# Patient Record
Sex: FEMALE | Race: WHITE | NOT HISPANIC OR LATINO | Employment: OTHER | URBAN - NONMETROPOLITAN AREA
[De-identification: names, ages, dates, MRNs, and addresses within clinical notes are randomized per-mention and may not be internally consistent; named-entity substitution may affect disease eponyms.]

---

## 2017-03-01 DIAGNOSIS — F33.1 MAJOR DEPRESSIVE DISORDER, RECURRENT EPISODE, MODERATE (HCC): ICD-10-CM

## 2017-03-02 RX ORDER — SERTRALINE HYDROCHLORIDE 100 MG/1
150 TABLET, FILM COATED ORAL DAILY
Qty: 45 TABLET | Refills: 1 | Status: SHIPPED | OUTPATIENT
Start: 2017-03-02 | End: 2017-05-15 | Stop reason: SDUPTHER

## 2017-05-15 ENCOUNTER — OFFICE VISIT (OUTPATIENT)
Dept: PSYCHIATRY | Facility: CLINIC | Age: 67
End: 2017-05-15

## 2017-05-15 VITALS
HEART RATE: 85 BPM | BODY MASS INDEX: 32.33 KG/M2 | WEIGHT: 206 LBS | DIASTOLIC BLOOD PRESSURE: 57 MMHG | SYSTOLIC BLOOD PRESSURE: 120 MMHG | HEIGHT: 67 IN

## 2017-05-15 DIAGNOSIS — E78.5 HYPERLIPIDEMIA, UNSPECIFIED HYPERLIPIDEMIA TYPE: ICD-10-CM

## 2017-05-15 DIAGNOSIS — F33.1 MAJOR DEPRESSIVE DISORDER, RECURRENT EPISODE, MODERATE (HCC): Primary | ICD-10-CM

## 2017-05-15 DIAGNOSIS — F41.1 GENERALIZED ANXIETY DISORDER: ICD-10-CM

## 2017-05-15 DIAGNOSIS — F33.1 MAJOR DEPRESSIVE DISORDER, RECURRENT EPISODE, MODERATE (HCC): ICD-10-CM

## 2017-05-15 DIAGNOSIS — I15.9 SECONDARY HYPERTENSION: ICD-10-CM

## 2017-05-15 PROCEDURE — 99213 OFFICE O/P EST LOW 20 MIN: CPT | Performed by: NURSE PRACTITIONER

## 2017-05-15 RX ORDER — INSULIN LISPRO 100 [IU]/ML
INJECTION, SOLUTION INTRAVENOUS; SUBCUTANEOUS
COMMUNITY
Start: 2017-03-11 | End: 2022-07-22

## 2017-05-15 RX ORDER — SERTRALINE HYDROCHLORIDE 100 MG/1
150 TABLET, FILM COATED ORAL DAILY
Qty: 45 TABLET | Refills: 2 | Status: SHIPPED | OUTPATIENT
Start: 2017-05-15 | End: 2017-08-04 | Stop reason: SDUPTHER

## 2017-05-15 RX ORDER — INSULIN GLARGINE 100 [IU]/ML
INJECTION, SOLUTION SUBCUTANEOUS DAILY
COMMUNITY
End: 2022-07-22

## 2017-05-15 RX ORDER — MIRTAZAPINE 30 MG/1
30 TABLET, FILM COATED ORAL NIGHTLY
Qty: 90 TABLET | Refills: 1 | Status: SHIPPED | OUTPATIENT
Start: 2017-05-15 | End: 2017-05-15 | Stop reason: HOSPADM

## 2017-05-15 RX ORDER — SERTRALINE HYDROCHLORIDE 100 MG/1
150 TABLET, FILM COATED ORAL DAILY
Qty: 45 TABLET | Refills: 2 | Status: SHIPPED | OUTPATIENT
Start: 2017-05-15 | End: 2017-05-15 | Stop reason: SDUPTHER

## 2017-05-15 RX ORDER — TRAZODONE HYDROCHLORIDE 50 MG/1
50 TABLET ORAL NIGHTLY
Qty: 90 TABLET | Refills: 1 | Status: SHIPPED | OUTPATIENT
Start: 2017-05-15 | End: 2017-09-12 | Stop reason: SDUPTHER

## 2017-05-15 RX ORDER — PANTOPRAZOLE SODIUM 20 MG/1
TABLET, DELAYED RELEASE ORAL DAILY
COMMUNITY
Start: 2017-04-20 | End: 2018-11-06 | Stop reason: DRUGHIGH

## 2017-07-21 ENCOUNTER — OFFICE VISIT (OUTPATIENT)
Dept: PSYCHIATRY | Facility: CLINIC | Age: 67
End: 2017-07-21

## 2017-07-21 DIAGNOSIS — F41.1 GENERALIZED ANXIETY DISORDER: ICD-10-CM

## 2017-07-21 DIAGNOSIS — F33.1 MODERATE EPISODE OF RECURRENT MAJOR DEPRESSIVE DISORDER (HCC): Primary | ICD-10-CM

## 2017-07-21 PROCEDURE — 90834 PSYTX W PT 45 MINUTES: CPT | Performed by: SOCIAL WORKER

## 2017-07-21 NOTE — PROGRESS NOTES
"    PROGRESS NOTE  Data:  Charito Freeman was seen for her regularly scheduled individual psychotherapy session with Ivonne Saeed LCSW beginning at 9:30 am and ending at 10:10 am.  Patient reports few symptoms, but she does get upset with \"everything that's going on.\"    (Scales based on 0 - 10 with 10 being the worst)  Depression: 3 Anxiety: 3   Distress: 5 Sleep: 0   Tasks Completed on Time: 3 Mood: 4   Number of Panic Attacks: 0 Appetite: 1     This was patient's first session with this therapist.  Therapist explained that Medicare will no longer pay for her to see Pia, so she has been reassigned to me.  Pt. reports she didn't want to come today because it would be like \"starting all over\" with someone new.  She states she still doesn't get along with her sister, but that's not going to change.  The biggest thing bothering her right now is not being able to find a doctor.  The nurse practitioner she saw last was supposed to order an MRI, but rajesh did, despite Charito calling to remind her twice.  She is searching for a doctor (doesn't want to see a PA or Nurse Practitioner) and is waiting to hear from one about whether he will take her.        Mental Status Exam  Appearance:  Casual, disheveled  Attitude toward clinician:  cooperative and agreeable   Speech:    Rate:  slow    Volume:  normal  Motor:  no abnormal movements present  Mood:  Appears depressed  Affect:  irritable and blunted  Thought Processes:  linear, logical, and goal directed  Thought Content:  normal  Suicidal Thoughts:  absent  Homicidal Thoughts:  absent  Perceptual Disturbance: no perceptual disturbance  Attention and Concentration:  good  Insight and Judgement:  fair  Memory:  deficit in short-term    Patient's Support Network Includes:   and children.  Assessment/Plan   Clinical Maneuvering/Intervention:  Gave support and validation about changing therapists.  Allowed patient to process feelings about family issues and problems with " finding a new doctor.  Discussed therapeutic goals she thinks are most important, and agreed that coping skills for managing feelings about family and problem solving ways to maintain/improve health would be most helpful.   Diagnoses and all orders for this visit:    Moderate episode of recurrent major depressive disorder    Generalized anxiety disorder      Return in about 2 weeks (around 8/4/2017).

## 2017-08-04 ENCOUNTER — TELEPHONE (OUTPATIENT)
Dept: PSYCHIATRY | Facility: CLINIC | Age: 67
End: 2017-08-04

## 2017-08-04 ENCOUNTER — OFFICE VISIT (OUTPATIENT)
Dept: PSYCHIATRY | Facility: CLINIC | Age: 67
End: 2017-08-04

## 2017-08-04 DIAGNOSIS — F33.1 MAJOR DEPRESSIVE DISORDER, RECURRENT EPISODE, MODERATE (HCC): ICD-10-CM

## 2017-08-04 DIAGNOSIS — F41.1 GENERALIZED ANXIETY DISORDER: ICD-10-CM

## 2017-08-04 DIAGNOSIS — F33.1 MAJOR DEPRESSIVE DISORDER, RECURRENT EPISODE, MODERATE (HCC): Primary | ICD-10-CM

## 2017-08-04 PROCEDURE — 90834 PSYTX W PT 45 MINUTES: CPT | Performed by: SOCIAL WORKER

## 2017-08-04 RX ORDER — SERTRALINE HYDROCHLORIDE 100 MG/1
TABLET, FILM COATED ORAL
Qty: 45 TABLET | Refills: 0 | Status: SHIPPED | OUTPATIENT
Start: 2017-08-04 | End: 2017-09-12 | Stop reason: SDUPTHER
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
## 2017-09-12 ENCOUNTER — OFFICE VISIT (OUTPATIENT)
Dept: PSYCHIATRY | Facility: CLINIC | Age: 67
End: 2017-09-12

## 2017-09-12 VITALS
BODY MASS INDEX: 32.02 KG/M2 | HEIGHT: 67 IN | SYSTOLIC BLOOD PRESSURE: 104 MMHG | HEART RATE: 89 BPM | DIASTOLIC BLOOD PRESSURE: 66 MMHG | WEIGHT: 204 LBS

## 2017-09-12 DIAGNOSIS — F33.1 MAJOR DEPRESSIVE DISORDER, RECURRENT EPISODE, MODERATE (HCC): Primary | ICD-10-CM

## 2017-09-12 PROCEDURE — 99213 OFFICE O/P EST LOW 20 MIN: CPT | Performed by: NURSE PRACTITIONER

## 2017-09-12 RX ORDER — SERTRALINE HYDROCHLORIDE 100 MG/1
150 TABLET, FILM COATED ORAL DAILY
Qty: 45 TABLET | Refills: 0 | Status: SHIPPED | OUTPATIENT
Start: 2017-09-12 | End: 2017-09-19 | Stop reason: SDUPTHER

## 2017-09-12 RX ORDER — INSULIN GLARGINE 100 [IU]/ML
INJECTION, SOLUTION SUBCUTANEOUS
COMMUNITY
Start: 2017-06-14 | End: 2022-07-22

## 2017-09-12 RX ORDER — ATORVASTATIN CALCIUM 10 MG/1
TABLET, FILM COATED ORAL
COMMUNITY
Start: 2017-06-14 | End: 2022-07-22

## 2017-09-12 RX ORDER — DULAGLUTIDE 0.75 MG/.5ML
INJECTION, SOLUTION SUBCUTANEOUS
COMMUNITY
Start: 2017-07-28 | End: 2022-07-22

## 2017-09-12 RX ORDER — RANITIDINE 300 MG/1
TABLET ORAL
COMMUNITY
Start: 2017-08-28 | End: 2022-07-22

## 2017-09-12 RX ORDER — ARIPIPRAZOLE 2 MG/1
2 TABLET ORAL DAILY
Qty: 30 TABLET | Refills: 0 | Status: SHIPPED | OUTPATIENT
Start: 2017-09-12 | End: 2017-09-21 | Stop reason: SDUPTHER

## 2017-09-12 RX ORDER — TRAZODONE HYDROCHLORIDE 50 MG/1
50 TABLET ORAL NIGHTLY
Qty: 90 TABLET | Refills: 0 | Status: SHIPPED | OUTPATIENT
Start: 2017-09-12 | End: 2017-10-10 | Stop reason: SDUPTHER

## 2017-09-12 RX ORDER — HYDROXYZINE HYDROCHLORIDE 25 MG/1
TABLET, FILM COATED ORAL
Qty: 90 TABLET | Refills: 0 | Status: SHIPPED | OUTPATIENT
Start: 2017-09-12 | End: 2017-10-10 | Stop reason: SDUPTHER

## 2017-09-12 NOTE — PROGRESS NOTES
"Subjective   Charito Freeman is a 66 y.o. female who is here today for medication management follow up.    Chief Complaint:  I'm alright  HPI:  History of Present Illness   She shares she is not having any side effects from the medication. She feels her mood has been stable. She is sleeping well getting 5-6 hours a night. She is still feeling continuously depressed, napping during the day. Denies any crying episodes \"I don't but I would.\" She feels hopeless, helpless, worthless at times. Rates depression 8 on 0-10 scale with 10 worst. Continues to struggle with fatigue and lack of energy. She is not worrying about things and managing anxiety. Denies any thoughts of self-harm or harm to others. She is having issues with her acid reflux feeling that it is worsening. She has an appointment with PCP in a couple weeks. She continues to isolate and withdraw herself, only getting out of the house to go to the grocery or to doctor's appointments. She doesn't have motivation to do anything. No new stressors at this time.      The following portions of the patient's history were reviewed and updated as appropriate: allergies, current medications, past family history, past medical history, past social history, past surgical history and problem list.    Review of Systems  Patient denies any recent medical illnesses.  No acute cardiopulmonary symptoms evident.  No acute gastrointestinal complaints.  Patient's appetite and intake are adequate.  Patient's current weight compared with last weight.    Objective   Physical Exam  Blood pressure 104/66, pulse 89, height 67\" (170.2 cm), weight 204 lb (92.5 kg).    Allergies   Allergen Reactions   • Citalopram    • Dasatinib    • Imatinib    • Lisinopril        Current Medications:   Current Outpatient Prescriptions   Medication Sig Dispense Refill   • BOSULIF 100 MG chemo tablet      • gabapentin (NEURONTIN) 100 MG capsule      • HUMALOG KWIKPEN 100 UNIT/ML solution pen-injector      • " hydrOXYzine (ATARAX) 25 MG tablet Take 1 tablet daily as needed for anxiety 90 tablet 0   • insulin glargine (LANTUS) 100 UNIT/ML injection Inject  under the skin Daily.     • metFORMIN (GLUCOPHAGE) 1000 MG tablet 2 (Two) Times a Day.     • metoprolol tartrate (LOPRESSOR) 25 MG tablet Take 25 mg by mouth 2 (two) times a day.     • oxybutynin (DITROPAN) 5 MG tablet Take 5 mg by mouth daily.     • pantoprazole (PROTONIX) 20 MG EC tablet Daily.     • sertraline (ZOLOFT) 100 MG tablet Take 1.5 tablets by mouth Daily. 45 tablet 0   • traZODone (DESYREL) 50 MG tablet Take 1 tablet by mouth Every Night. 90 tablet 0   • ARIPiprazole (ABILIFY) 2 MG tablet Take 1 tablet by mouth Daily. 30 tablet 0   • atorvastatin (LIPITOR) 10 MG tablet      • diclofenac (VOLTAREN) 1 % gel gel      • LANTUS SOLOSTAR 100 UNIT/ML injection pen      • raNITIdine (ZANTAC) 300 MG tablet      • TRULICITY 0.75 MG/0.5ML solution pen-injector        No current facility-administered medications for this visit.        Mental Status Exam:   Hygiene:   fair  Cooperation:  Cooperative  Eye Contact:  Good  Psychomotor Behavior:  Appropriate  Affect:  Full range  Hopelessness: 2  Speech:  Normal  Thought Process:  Goal directed and Linear  Thought Content:  Normal  Suicidal:  None  Homicidal:  None  Hallucinations:  None  Delusion:  None  Memory:  Intact  Orientation:  Person, Place, Time and Situation  Reliability:  fair  Insight:  Fair  Judgement:  Fair  Impulse Control:  Fair  Physical/Medical Issues:  Yes luekemia, dm.    Assessment/Plan   Diagnoses and all orders for this visit:    Major depressive disorder, recurrent episode, moderate  -     sertraline (ZOLOFT) 100 MG tablet; Take 1.5 tablets by mouth Daily.  -     traZODone (DESYREL) 50 MG tablet; Take 1 tablet by mouth Every Night.  -     hydrOXYzine (ATARAX) 25 MG tablet; Take 1 tablet daily as needed for anxiety    Other orders  -     ARIPiprazole (ABILIFY) 2 MG tablet; Take 1 tablet by mouth  Daily.      Patient was instructed on medication side effects, benefits, and also of no treatment.  Patient was given an explanation regarding potential for metabolic profile and weight gain.  Labs will be assessed as clinically indicated.We will add abilify 2mg as an adjunct medication to the zoloft to help with depressive symptomology.    Diet was discussed especially healthy diet choices and increasing activity and exercise.  Patient was strongly urged to continue weight maintance or weight loss efforts.  Patient reported verbalized understanding of instructions    We discussed risks, benefits, and side effects of the above medication and the patient was agreeable with the plan.    Return in about 4 weeks (around 10/10/2017), or if symptoms worsen or fail to improve, for Next scheduled follow up.  The patient was instructed to call clinic as needed or go to ER if in crisis.

## 2017-09-19 DIAGNOSIS — F33.1 MAJOR DEPRESSIVE DISORDER, RECURRENT EPISODE, MODERATE (HCC): ICD-10-CM

## 2017-09-19 RX ORDER — SERTRALINE HYDROCHLORIDE 100 MG/1
TABLET, FILM COATED ORAL
Qty: 45 TABLET | Refills: 1 | Status: SHIPPED | OUTPATIENT
Start: 2017-09-19 | End: 2017-10-10 | Stop reason: SDUPTHER

## 2017-09-21 RX ORDER — ARIPIPRAZOLE 2 MG/1
2 TABLET ORAL DAILY
Qty: 30 TABLET | Refills: 0 | Status: SHIPPED | OUTPATIENT
Start: 2017-09-21 | End: 2017-10-10 | Stop reason: SDUPTHER

## 2017-09-21 NOTE — TELEPHONE ENCOUNTER
Patient called stated she was suppose to have a script to go to [facility] and a script for 90 day supply got to the mail order.

## 2017-09-22 NOTE — TELEPHONE ENCOUNTER
Cecilia tried calling the patient several times to let her know her RX has been sent to the pharmacy

## 2017-10-10 ENCOUNTER — OFFICE VISIT (OUTPATIENT)
Dept: PSYCHIATRY | Facility: CLINIC | Age: 67
End: 2017-10-10

## 2017-10-10 VITALS
HEIGHT: 67 IN | HEART RATE: 103 BPM | SYSTOLIC BLOOD PRESSURE: 125 MMHG | BODY MASS INDEX: 32.33 KG/M2 | WEIGHT: 206 LBS | DIASTOLIC BLOOD PRESSURE: 79 MMHG

## 2017-10-10 DIAGNOSIS — F34.1 DYSTHYMIA: Primary | ICD-10-CM

## 2017-10-10 PROCEDURE — 99213 OFFICE O/P EST LOW 20 MIN: CPT | Performed by: NURSE PRACTITIONER

## 2017-10-10 RX ORDER — ARIPIPRAZOLE 2 MG/1
2 TABLET ORAL DAILY
Qty: 90 TABLET | Refills: 0 | Status: SHIPPED | OUTPATIENT
Start: 2017-10-10 | End: 2017-11-08 | Stop reason: SDUPTHER

## 2017-10-10 RX ORDER — LEVOTHYROXINE SODIUM 0.03 MG/1
TABLET ORAL
COMMUNITY
Start: 2017-09-14 | End: 2017-10-10 | Stop reason: SDDI

## 2017-10-10 RX ORDER — HYDROXYZINE HYDROCHLORIDE 25 MG/1
TABLET, FILM COATED ORAL
Qty: 90 TABLET | Refills: 0 | Status: SHIPPED | OUTPATIENT
Start: 2017-10-10 | End: 2017-12-19 | Stop reason: SDUPTHER

## 2017-10-10 RX ORDER — SERTRALINE HYDROCHLORIDE 100 MG/1
150 TABLET, FILM COATED ORAL DAILY
Qty: 135 TABLET | Refills: 0 | Status: SHIPPED | OUTPATIENT
Start: 2017-10-10 | End: 2017-11-08 | Stop reason: SDUPTHER

## 2017-10-10 RX ORDER — TRAZODONE HYDROCHLORIDE 50 MG/1
50 TABLET ORAL NIGHTLY
Qty: 90 TABLET | Refills: 0 | Status: SHIPPED | OUTPATIENT
Start: 2017-10-10 | End: 2017-11-08 | Stop reason: SDUPTHER

## 2017-10-10 NOTE — PROGRESS NOTES
"Subjective   Charito Freeman is a 67 y.o. female who is here today for medication management follow up.    Chief Complaint:  I'm alright  HPI:  History of Present Illness     She states that her health is declining. She is on a lot of medication \"I'm on over 20 different pills\" and that is overwhelming to her. States she wishes she could cry but tears won't come. This makes her feel in a deep depression so \"I don't care for nothing right now.\" She shares she slept more than 1/2 the day yesterday. She usually gets around 7 or 8 hours without any issues initiating or remaining asleep. She reports depressive symptoms of fatigue, unalbe to cook Sunday dinner due to no energy, HHW. She doesn't like to leave house and is isolating and withdrawing from interacting/socializing with others. These symptoms have gotten worse since her 's parents passed away 3 years ago. She has felt this way daily for the majority of the day for the last three years. She states \"I'd just rather go to the nursing home so my  doesn't have to deal with me anymore.\" She admits to being stubborn and not liking to take medication. She quit going to [employer] and will only watch it on television. She rates her depression an 8 today on 0-10 scale with 10 worst. Her appetite is fair with no significant weight changes. She denies any side effects from the medications. She shares anxiety in the sense of always edge about her health and being sick, unable to drive, overall just generalized feeling bad both mentally and physically. She remains completing her daily ADL's but it is a struggle and she is noted to have impairment in important areas of functioning. Denies any new stressors, no new medical issues/complaints to report. She states she regularly has blood work done and had it done about 2 weeks ago. She finds it difficult to keep up with everything that regards her health. She shares she is having hypo thyroidism but isn't currently " "compliant on the medications. Her BS have been high and unstable around 200-250 when she checks it. She is not compliant with medications at times and states \"some days I just don't want to fool with it.\" She didn't make her therapy appointment here with Pia and has yet to reschedule. She has a negative outlook and attitude on life \"nothing does any good.\" She doesn't even care about taking any more tests. She was recently told earlier today the results of Xray that her bowels were full of stool and she has a lot of reflux. She has f/u with PCP in 3 weeks to decide on further treatment regarding her medical treatments. She adamantly denies SI/HI/AH/VH at this time.       The following portions of the patient's history were reviewed and updated as appropriate: allergies, current medications, past family history, past medical history, past social history, past surgical history and problem list.    Review of Systems   Constitutional: Negative for activity change, appetite change and fatigue.   HENT: Negative.    Eyes: Negative for visual disturbance.   Respiratory: Negative.    Cardiovascular: Negative.    Gastrointestinal: Negative for nausea.   Endocrine: Negative.    Genitourinary: Negative.    Musculoskeletal: Negative for arthralgias.   Skin: Negative.    Allergic/Immunologic: Negative.    Neurological: Negative for dizziness, seizures and headaches.   Hematological: Negative.    Psychiatric/Behavioral: Positive for decreased concentration and dysphoric mood. Negative for agitation, behavioral problems, confusion, hallucinations, self-injury, sleep disturbance and suicidal ideas. The patient is nervous/anxious. The patient is not hyperactive.      Patient denies any recent medical illnesses.  No acute cardiopulmonary symptoms evident.  No acute gastrointestinal complaints.  Patient's appetite and intake are adequate.  Patient's current weight compared with last weight.    Objective   Physical Exam " "  Constitutional: She is oriented to person, place, and time. She appears well-developed and well-nourished. No distress.   Neurological: She is alert and oriented to person, place, and time.   Skin: She is not diaphoretic.   Vitals reviewed.    Blood pressure 125/79, pulse 103, height 67\" (170.2 cm), weight 206 lb (93.4 kg).    Allergies   Allergen Reactions   • Citalopram    • Dasatinib    • Imatinib    • Lisinopril        Current Medications:   Current Outpatient Prescriptions   Medication Sig Dispense Refill   • ARIPiprazole (ABILIFY) 2 MG tablet Take 1 tablet by mouth Daily. 90 tablet 0   • atorvastatin (LIPITOR) 10 MG tablet      • BOSULIF 100 MG chemo tablet      • diclofenac (VOLTAREN) 1 % gel gel      • gabapentin (NEURONTIN) 100 MG capsule      • HUMALOG KWIKPEN 100 UNIT/ML solution pen-injector      • hydrOXYzine (ATARAX) 25 MG tablet Take 1 tablet daily as needed for anxiety 90 tablet 0   • insulin glargine (LANTUS) 100 UNIT/ML injection Inject  under the skin Daily.     • LANTUS SOLOSTAR 100 UNIT/ML injection pen      • metFORMIN (GLUCOPHAGE) 1000 MG tablet 2 (Two) Times a Day.     • metoprolol tartrate (LOPRESSOR) 25 MG tablet Take 25 mg by mouth 2 (two) times a day.     • oxybutynin (DITROPAN) 5 MG tablet Take 5 mg by mouth daily.     • pantoprazole (PROTONIX) 20 MG EC tablet Daily.     • raNITIdine (ZANTAC) 300 MG tablet      • sertraline (ZOLOFT) 100 MG tablet Take 1.5 tablets by mouth Daily. 135 tablet 0   • traZODone (DESYREL) 50 MG tablet Take 1 tablet by mouth Every Night. 90 tablet 0   • TRULICITY 0.75 MG/0.5ML solution pen-injector        No current facility-administered medications for this visit.        Mental Status Exam:   Hygiene:   fair  Cooperation:  Cooperative  Eye Contact:  Good  Psychomotor Behavior:  Appropriate  Affect:  Full range  Hopelessness: 2  Speech:  Normal  Thought Process:  Linear  Thought Content:  Normal  Suicidal:  None  Homicidal:  None  Hallucinations:  " None  Delusion:  None  Memory:  Intact  Orientation:  Person, Place, Time and Situation  Reliability:  fair  Insight:  Fair  Judgement:  Fair  Impulse Control:  Fair  Physical/Medical Issues:  Yes luekemia, dm, GI issues    Assessment/Plan   Diagnoses and all orders for this visit:    Dysthymia  -     ARIPiprazole (ABILIFY) 2 MG tablet; Take 1 tablet by mouth Daily.  -     hydrOXYzine (ATARAX) 25 MG tablet; Take 1 tablet daily as needed for anxiety  -     sertraline (ZOLOFT) 100 MG tablet; Take 1.5 tablets by mouth Daily.  -     traZODone (DESYREL) 50 MG tablet; Take 1 tablet by mouth Every Night.        Patient was instructed on medication side effects, benefits, and also of no treatment. Since she is not continually compliant with medication we will not make any changes at this time and unsure if abilify will help. Counseled on how these types of meds work. Counseled on importance of medication compliance.   Diet was discussed especially healthy diet choices and increasing activity and exercise.  Patient was strongly urged to continue weight maintance or weight loss efforts.  Patient reported verbalized understanding of instructions  Encouraged patient to make appointment with Pia for psychotherapy    We discussed risks, benefits, and side effects of the above medication and the patient was agreeable with the plan.  Pt's show significant impairment in multiple important areas of functioning. Her prognosis is fair and patient needs to consider importance of continued treatment and medication compliance.    Return in about 4 weeks (around 11/7/2017), or if symptoms worsen or fail to improve, for Next scheduled follow up.  The patient was instructed to call clinic as needed or go to ER if in crisis.

## 2017-11-08 ENCOUNTER — OFFICE VISIT (OUTPATIENT)
Dept: PSYCHIATRY | Facility: CLINIC | Age: 67
End: 2017-11-08

## 2017-11-08 VITALS
WEIGHT: 205 LBS | HEART RATE: 84 BPM | HEIGHT: 67 IN | SYSTOLIC BLOOD PRESSURE: 122 MMHG | DIASTOLIC BLOOD PRESSURE: 70 MMHG | BODY MASS INDEX: 32.18 KG/M2

## 2017-11-08 DIAGNOSIS — F34.1 DYSTHYMIA: Primary | ICD-10-CM

## 2017-11-08 PROCEDURE — 99213 OFFICE O/P EST LOW 20 MIN: CPT | Performed by: NURSE PRACTITIONER

## 2017-11-08 RX ORDER — TRAZODONE HYDROCHLORIDE 50 MG/1
50 TABLET ORAL NIGHTLY
Qty: 90 TABLET | Refills: 1 | Status: SHIPPED | OUTPATIENT
Start: 2017-11-08 | End: 2018-03-22 | Stop reason: SDUPTHER

## 2017-11-08 RX ORDER — SERTRALINE HYDROCHLORIDE 100 MG/1
TABLET, FILM COATED ORAL
Qty: 60 TABLET | Refills: 1 | Status: SHIPPED | OUTPATIENT
Start: 2017-11-08 | End: 2017-12-19 | Stop reason: SDUPTHER

## 2017-11-08 RX ORDER — BUPROPION HYDROCHLORIDE 150 MG/1
150 TABLET ORAL EVERY MORNING
Qty: 30 TABLET | Refills: 1 | Status: SHIPPED | OUTPATIENT
Start: 2017-11-08 | End: 2017-12-19 | Stop reason: SDUPTHER

## 2017-11-08 RX ORDER — LEVOTHYROXINE SODIUM 0.03 MG/1
TABLET ORAL DAILY
COMMUNITY
Start: 2017-10-24

## 2017-11-08 RX ORDER — ARIPIPRAZOLE 2 MG/1
2 TABLET ORAL DAILY
Qty: 90 TABLET | Refills: 1 | Status: SHIPPED | OUTPATIENT
Start: 2017-11-08 | End: 2018-03-22

## 2017-11-08 NOTE — PROGRESS NOTES
"Subjective   Charito Freeman is a 67 y.o. female who is here today for medication management follow up.    Chief Complaint:  I'm alright  HPI:  History of Present Illness     Pt did not make it to her family dr appointment she has rescheduled.  Says she is taking all her medication daily as prescribed except for Zoloft..she is actually taking 2 a day.  Still feeling depressed and isolating at home.  Pt states the abilify is helping with her getting up out of the bed.  Is not sleeping now during the day.  Denies SI/HI/A/V hallucinations.  Really struggles with other set of grandparents with spending time with grandkids.  Their relationship is rather strained.  Says her daughter is closer and does more things with her mother-in-law. Says her grandchildren only do things with the other grandmother.  She is afraid to call them directly vs [facility] because her daughter will stop speaking to her.  \"i am stubborn, and I just do not want to bother them.\"   Pt did not keep her appointment with the therapist.  Says she was too tired that day.   Pt is sleeping adequately at night.  Pt is not going to [facility].  Has not been regularly in over 2 years.  Continues to watch it on TV.  Goes to grocery.  Has appointment with endocronologist in [place] tomorrow.  Depression scale today 7/10.  Pt is forgetful at times.  No new health issues.  Symptoms are affecting her activities of daily living.      The following portions of the patient's history were reviewed and updated as appropriate: allergies, current medications, past family history, past medical history, past social history, past surgical history and problem list.    Review of Systems   Constitutional: Negative for activity change, appetite change and fatigue.   HENT: Negative.    Eyes: Negative for visual disturbance.   Respiratory: Negative.    Cardiovascular: Negative.    Gastrointestinal: Negative for nausea.   Endocrine: Negative.    Genitourinary: Negative.  " "  Musculoskeletal: Negative for arthralgias.   Skin: Negative.    Allergic/Immunologic: Negative.    Neurological: Negative for dizziness, seizures and headaches.   Hematological: Negative.    Psychiatric/Behavioral: Positive for decreased concentration and dysphoric mood. Negative for agitation, behavioral problems, confusion, hallucinations, self-injury, sleep disturbance and suicidal ideas. The patient is nervous/anxious. The patient is not hyperactive.      Patient denies any recent medical illnesses.  No acute cardiopulmonary symptoms evident.  No acute gastrointestinal complaints.  Patient's appetite and intake are adequate.  Patient's current weight compared with last weight.    Objective   Physical Exam   Constitutional: She is oriented to person, place, and time. She appears well-developed and well-nourished. No distress.   Neurological: She is alert and oriented to person, place, and time.   Skin: She is not diaphoretic.   Vitals reviewed.    Blood pressure 122/70, pulse 84, height 67\" (170.2 cm), weight 205 lb (93 kg).    Allergies   Allergen Reactions   • Citalopram    • Dasatinib    • Imatinib    • Lisinopril        Current Medications:   Current Outpatient Prescriptions   Medication Sig Dispense Refill   • ARIPiprazole (ABILIFY) 2 MG tablet Take 1 tablet by mouth Daily. 90 tablet 1   • atorvastatin (LIPITOR) 10 MG tablet      • BOSULIF 100 MG chemo tablet      • diclofenac (VOLTAREN) 1 % gel gel      • gabapentin (NEURONTIN) 100 MG capsule      • HUMALOG KWIKPEN 100 UNIT/ML solution pen-injector      • insulin glargine (LANTUS) 100 UNIT/ML injection Inject  under the skin Daily.     • LANTUS SOLOSTAR 100 UNIT/ML injection pen      • levothyroxine (SYNTHROID, LEVOTHROID) 25 MCG tablet Daily.     • metFORMIN (GLUCOPHAGE) 1000 MG tablet 2 (Two) Times a Day.     • metoprolol tartrate (LOPRESSOR) 25 MG tablet Take 25 mg by mouth 2 (two) times a day.     • oxybutynin (DITROPAN) 5 MG tablet Take 5 mg by mouth " daily.     • pantoprazole (PROTONIX) 20 MG EC tablet Daily.     • raNITIdine (ZANTAC) 300 MG tablet      • sertraline (ZOLOFT) 100 MG tablet 2 Po daily 60 tablet 1   • traZODone (DESYREL) 50 MG tablet Take 1 tablet by mouth Every Night. 90 tablet 1   • TRULICITY 0.75 MG/0.5ML solution pen-injector      • buPROPion XL (WELLBUTRIN XL) 150 MG 24 hr tablet Take 1 tablet by mouth Every Morning. 30 tablet 1   • hydrOXYzine (ATARAX) 25 MG tablet Take 1 tablet daily as needed for anxiety 90 tablet 0     No current facility-administered medications for this visit.        Mental Status Exam:   Hygiene:   fair  Cooperation:  Cooperative  Eye Contact:  Good  Psychomotor Behavior:  Appropriate  Affect:  Full range  Hopelessness: 2  Speech:  Normal  Thought Process:  Linear  Thought Content:  Normal  Suicidal:  None  Homicidal:  None  Hallucinations:  None  Delusion:  None  Memory:  Intact  Orientation:  Person, Place, Time and Situation  Reliability:  fair  Insight:  Fair  Judgement:  Fair  Impulse Control:  Fair  Physical/Medical Issues:  Yes luekemia, dm, GI issues    Assessment/Plan   Diagnoses and all orders for this visit:    Dysthymia  -     ARIPiprazole (ABILIFY) 2 MG tablet; Take 1 tablet by mouth Daily.  -     traZODone (DESYREL) 50 MG tablet; Take 1 tablet by mouth Every Night.  -     sertraline (ZOLOFT) 100 MG tablet; 2 Po daily  -     buPROPion XL (WELLBUTRIN XL) 150 MG 24 hr tablet; Take 1 tablet by mouth Every Morning.      We will continue current medications and add Wellbutrin to try and get the patient more energized.  Hesitant to increase abilify at the present due to her diabetes.    Patient was instructed on medication side effects, benefits, and also of no treatment. Since she is not continually compliant with medication we will not make any changes at this time and unsure if abilify will help. Counseled on how these types of meds work. Counseled on importance of medication compliance.   Diet was discussed  especially healthy diet choices and increasing activity and exercise.  Patient was strongly urged to continue weight maintance or weight loss efforts.  Patient reported verbalized understanding of instructions  Will schedule patient  With Pia for psychotherapy     We discussed risks, benefits, and side effects of the above medication and the patient was agreeable with the plan.  Pt's show significant impairment in multiple important areas of functioning. Her prognosis is fair and patient needs to consider importance of continued treatment and medication compliance.    No Follow-up on file.  The patient was instructed to call clinic as needed or go to ER if in crisis.

## 2017-11-17 DIAGNOSIS — F33.1 MAJOR DEPRESSIVE DISORDER, RECURRENT EPISODE, MODERATE (HCC): ICD-10-CM

## 2017-11-20 RX ORDER — TRAZODONE HYDROCHLORIDE 50 MG/1
TABLET ORAL
Qty: 90 TABLET | Refills: 0 | Status: SHIPPED | OUTPATIENT
Start: 2017-11-20 | End: 2017-12-19

## 2017-12-19 ENCOUNTER — OFFICE VISIT (OUTPATIENT)
Dept: PSYCHIATRY | Facility: CLINIC | Age: 67
End: 2017-12-19

## 2017-12-19 VITALS
HEART RATE: 108 BPM | DIASTOLIC BLOOD PRESSURE: 76 MMHG | WEIGHT: 203 LBS | HEIGHT: 67 IN | BODY MASS INDEX: 31.86 KG/M2 | SYSTOLIC BLOOD PRESSURE: 136 MMHG

## 2017-12-19 DIAGNOSIS — F34.1 DYSTHYMIA: ICD-10-CM

## 2017-12-19 PROCEDURE — 99214 OFFICE O/P EST MOD 30 MIN: CPT | Performed by: NURSE PRACTITIONER

## 2017-12-19 RX ORDER — BUPROPION HYDROCHLORIDE 150 MG/1
150 TABLET ORAL EVERY MORNING
Qty: 90 TABLET | Refills: 0 | Status: SHIPPED | OUTPATIENT
Start: 2017-12-19 | End: 2018-02-14

## 2017-12-19 RX ORDER — SERTRALINE HYDROCHLORIDE 100 MG/1
200 TABLET, FILM COATED ORAL DAILY
Qty: 180 TABLET | Refills: 0 | Status: SHIPPED | OUTPATIENT
Start: 2017-12-19 | End: 2018-02-14

## 2017-12-19 RX ORDER — HYDROXYZINE HYDROCHLORIDE 25 MG/1
25 TABLET, FILM COATED ORAL 3 TIMES DAILY PRN
Qty: 180 TABLET | Refills: 0 | Status: SHIPPED | OUTPATIENT
Start: 2017-12-19 | End: 2018-02-14

## 2017-12-19 RX ORDER — POLYETHYLENE GLYCOL 3350 17 G/17G
17 POWDER, FOR SOLUTION ORAL DAILY
Qty: 90 PACKET | Refills: 0 | Status: SHIPPED | OUTPATIENT
Start: 2017-12-19 | End: 2022-07-22

## 2017-12-19 NOTE — PROGRESS NOTES
Subjective   Charito Freeman is a 67 y.o. female who is here today for medication management follow up.    Chief Complaint:  I'm alright  HPI:  History of Present Illness   She reports she has been sick over the last month, unable to spend Thanksgiving with family due to this. She still has a dry/sometimes productive cough. This set her back and gave her anxiety/frustration. However, she has been able to put a small 4ft [place] tree up and is looking forward to being able to spend some time with grandkids over [place] holidays. She worries she will get sick again and not get to do this which has increased her anxiety. She went to PCP about her constipation and says he was suppose to order her a stimulant laxative, but apparently when looking into this, it isn't covered by insurance which is why she didn't get it filled. States she is watching her BG levels and trying to keep her diabetes under control as they stayed high while she was sick. She denies any SEs from meds and shares she is compliant with her meds. Reports some mild improvement with wellbutrin and wants to continue taking this medication. States she is improving in her anxiety and depressive symptoms only giving depression a 5 and anxiety a 4 today with 10 worst. She continues to isolate at home, but has been able to get up and move around rather than laying in bed all day. She still has bouts of irritability, but states this also has decreased significantly. She reports sleeping well getting at least 8 hours of sleep a night and sometimes taking naps during the day. She states she dislikes being on so many medications as she has in the past. States she has no other new health issues other than the numbness she sometimes gets in her left arm from a bulging disc where she hurt her back a couple years ago, that has progressively worsened. She has been wanting an MRI and found that she first has to do PT for her insurance to cover an MRI. Is hoping they  will be able to intervene so that she has some relief of her continual pain. She started PT yesterday and has to go twice weekly for a month. States she was tolerable of her first visit. She was praised for pushing herself and attempting to improve her health both physically and mentally. Reports she has an appointment soon with endocrinologist that she had to reschedule last month due to her being sick. She states her appetite is fair and she is tolerating food without significant weight change. She adamantly denies any SI/HI/AH/VH at this time.  Symptoms are continuing to affect activities of daily living.  Her VS were reviewed. We discussed many alternative options to her constipation such as miralax.    The following portions of the patient's history were reviewed and updated as appropriate: allergies, current medications, past family history, past medical history, past social history, past surgical history and problem list.    Review of Systems   Constitutional: Negative for activity change, appetite change and fatigue.   HENT: Positive for congestion and sinus pressure.    Eyes: Negative for visual disturbance.   Respiratory: Negative.    Cardiovascular: Negative.    Gastrointestinal: Positive for constipation. Negative for nausea.   Endocrine: Negative.    Genitourinary: Negative.    Musculoskeletal: Negative for arthralgias.   Skin: Negative.    Allergic/Immunologic: Negative.    Neurological: Positive for numbness. Negative for dizziness, seizures and headaches.        In left arm r/t bulging disc    Hematological: Negative.    Psychiatric/Behavioral: Positive for decreased concentration and dysphoric mood. Negative for agitation, behavioral problems, confusion, hallucinations, self-injury, sleep disturbance and suicidal ideas. The patient is nervous/anxious. The patient is not hyperactive.      Patient denies any recent medical illnesses.  No acute cardiopulmonary symptoms evident.  No acute  "gastrointestinal complaints.  Patient's appetite and intake are adequate.  Patient's current weight compared with last weight.    Objective   Physical Exam   Constitutional: She is oriented to person, place, and time. She appears well-developed and well-nourished. No distress.   Neurological: She is alert and oriented to person, place, and time.   Skin: She is not diaphoretic.   Nursing note and vitals reviewed.    Blood pressure 136/76, pulse 108, height 170.2 cm (67\"), weight 92.1 kg (203 lb).    Allergies   Allergen Reactions   • Citalopram    • Dasatinib    • Imatinib    • Lisinopril        Current Medications:   Current Outpatient Prescriptions   Medication Sig Dispense Refill   • ARIPiprazole (ABILIFY) 2 MG tablet Take 1 tablet by mouth Daily. 90 tablet 1   • atorvastatin (LIPITOR) 10 MG tablet      • BOSULIF 100 MG chemo tablet      • buPROPion XL (WELLBUTRIN XL) 150 MG 24 hr tablet Take 1 tablet by mouth Every Morning. 90 tablet 0   • diclofenac (VOLTAREN) 1 % gel gel      • gabapentin (NEURONTIN) 100 MG capsule      • HUMALOG KWIKPEN 100 UNIT/ML solution pen-injector      • hydrOXYzine (ATARAX) 25 MG tablet Take 1 tablet by mouth 3 (Three) Times a Day As Needed for Anxiety. 180 tablet 0   • insulin glargine (LANTUS) 100 UNIT/ML injection Inject  under the skin Daily.     • LANTUS SOLOSTAR 100 UNIT/ML injection pen      • levothyroxine (SYNTHROID, LEVOTHROID) 25 MCG tablet Daily.     • metFORMIN (GLUCOPHAGE) 1000 MG tablet 2 (Two) Times a Day.     • metoprolol tartrate (LOPRESSOR) 25 MG tablet Take 25 mg by mouth 2 (two) times a day.     • pantoprazole (PROTONIX) 20 MG EC tablet Daily.     • raNITIdine (ZANTAC) 300 MG tablet      • sertraline (ZOLOFT) 100 MG tablet Take 2 tablets by mouth Daily. 180 tablet 0   • traZODone (DESYREL) 50 MG tablet Take 1 tablet by mouth Every Night. 90 tablet 1   • TRULICITY 0.75 MG/0.5ML solution pen-injector      • oxybutynin (DITROPAN) 5 MG tablet Take 5 mg by mouth daily.   "   • polyethylene glycol (MIRALAX) packet Take 17 g by mouth Daily. 90 packet 0     No current facility-administered medications for this visit.        Mental Status Exam:   Hygiene:   fair  Cooperation:  Cooperative  Eye Contact:  Good  Psychomotor Behavior:  Appropriate  Affect:  Full range  Hopelessness: 2  Speech:  Normal  Thought Process:  Linear  Thought Content:  Normal  Suicidal:  None  Homicidal:  None  Hallucinations:  None  Delusion:  None  Memory:  Intact  Orientation:  Person, Place, Time and Situation  Reliability:  fair  Insight:  Fair  Judgement:  Fair  Impulse Control:  Fair  Physical/Medical Issues:  Yes luekemia, dm, GI issues    Assessment/Plan   Diagnoses and all orders for this visit:    Dysthymia  -     buPROPion XL (WELLBUTRIN XL) 150 MG 24 hr tablet; Take 1 tablet by mouth Every Morning.  -     hydrOXYzine (ATARAX) 25 MG tablet; Take 1 tablet by mouth 3 (Three) Times a Day As Needed for Anxiety.  -     sertraline (ZOLOFT) 100 MG tablet; Take 2 tablets by mouth Daily.    Other orders  -     polyethylene glycol (MIRALAX) packet; Take 17 g by mouth Daily.      We will continue current medications as patient is showing some minor improvements, no worsening of her symptoms.  Hesitant to increase abilify at the present due to her diabetes.  She agrees to continue current meds as she is doing fair and reports mood as fairly stablized.   Patient was instructed on medication side effects, benefits, and also of no treatment. Since she is not continually compliant with medication we will not make any changes at this time and unsure if abilify will help. Counseled on how these types of meds work. Counseled on importance of medication compliance.   Diet was discussed especially healthy diet choices and increasing activity and exercise.  Patient was strongly urged to continue weight maintance or weight loss efforts.  Patient reported verbalized understanding of instructions  She refuses to reschedule  psychotherapy with Pia or any other provider at this time.   We discussed risks, benefits, and side effects of the above medication and the patient was agreeable with the plan.  Pt's show significant impairment in multiple important areas of functioning. Her prognosis is fair and patient needs to consider importance of continued treatment and medication compliance.    Return in about 8 weeks (around 2/13/2018), or if symptoms worsen or fail to improve, for Next scheduled follow up.  The patient was instructed to call clinic as needed or go to ER if in crisis.

## 2018-02-14 ENCOUNTER — OFFICE VISIT (OUTPATIENT)
Dept: PSYCHIATRY | Facility: CLINIC | Age: 68
End: 2018-02-14

## 2018-02-14 VITALS
WEIGHT: 207 LBS | DIASTOLIC BLOOD PRESSURE: 74 MMHG | HEART RATE: 89 BPM | SYSTOLIC BLOOD PRESSURE: 125 MMHG | HEIGHT: 67 IN | BODY MASS INDEX: 32.49 KG/M2

## 2018-02-14 DIAGNOSIS — F34.1 DYSTHYMIA: Primary | ICD-10-CM

## 2018-02-14 DIAGNOSIS — F41.1 GENERALIZED ANXIETY DISORDER: ICD-10-CM

## 2018-02-14 PROCEDURE — 99214 OFFICE O/P EST MOD 30 MIN: CPT | Performed by: NURSE PRACTITIONER

## 2018-02-14 RX ORDER — DESVENLAFAXINE 25 MG/1
25 TABLET, EXTENDED RELEASE ORAL DAILY
Qty: 30 TABLET | Refills: 0 | Status: SHIPPED | OUTPATIENT
Start: 2018-02-14 | End: 2018-03-12 | Stop reason: SDUPTHER

## 2018-02-14 NOTE — PROGRESS NOTES
"Subjective   Charito Freeman is a 67 y.o. female who is here today for medication management follow up.    Chief Complaint: haven't felt good lately  HPI:  History of Present Illness   Finally got MRI which showed a herniated disc. She is to see PCP next week to discuss her options. Reports she's had a lot of exacerbated depression due to \"not getting out\" she rates it today 7-8 with 10 worst. Denies any SI/HI/AH/VH. She has been having anhedonia, decreased energy, and fatigue. Her ADL's including hygiene/showering has limited. When asked about anxiety she states \"I'm in a state where I don't care what happens\" shares she use to \"fly off the handle\" at her  but that has ceased. She hasn't been sleeping well over the past 2 weeks going to bed around 10pm sleeping couple hours and getting up a couple hours before returning back to sleep- denies any nightmares. . Trazodone isn't helping. She is having hearing difficulty and needs a hearing test. She reports more compliance with medications- had HgA1c recently showing 7.2% down from 7.9%. Pt was praised for taking better care of herself. She reports ongoing issues with constipationg that the miralax didn't help and other stimulant laxatives didn't help so after deciding to use suppository laxative she was able to move bowels and has since been continuing miralax with regular BM's. States for the past 2 months the only meds she has been taking prescribed by undersigned is the abilify and the trazodone \"there's just so many I hate to take all that.\" States she has been taking 2 pills of abilify in the morning and 2 pills of abilify in the evening (8mg altogether) and trazodone at bedtime. States she continues to isolate at home. The weather doesn't help matters as it has been rainy the past couple weeks, but states she is going to help  trim vivi bushes tomorrow as it is suppose to be a nice day and warmer.   States she has felt very guilty over past couple " days as she bought a new light fixture to put up in kitchen and got  to finally work on it, but in the process he ended up irritating a bulging disc he had. She feels it is her fault for his bulging disc, even though he has since continued to work outside and piddle around on old cars without complaints of pain. Attempts at rationalizing situation were difficult.       The following portions of the patient's history were reviewed and updated as appropriate: allergies, current medications, past family history, past medical history, past social history, past surgical history and problem list.    Review of Systems   Constitutional: Positive for fatigue. Negative for activity change and appetite change.   HENT: Negative.  Negative for congestion and sinus pressure.    Eyes: Negative for visual disturbance.   Respiratory: Negative.    Cardiovascular: Negative.    Gastrointestinal: Negative for constipation and nausea.   Endocrine: Negative.    Genitourinary: Negative.    Musculoskeletal: Positive for arthralgias, back pain and myalgias.   Skin: Negative.    Allergic/Immunologic: Negative.    Neurological: Positive for numbness. Negative for dizziness, seizures and headaches.        In left arm r/t bulging disc    Hematological: Negative.    Psychiatric/Behavioral: Positive for agitation, confusion, decreased concentration, dysphoric mood and sleep disturbance. Negative for behavioral problems, hallucinations, self-injury and suicidal ideas. The patient is nervous/anxious. The patient is not hyperactive.      Patient denies any recent medical illnesses.  No acute cardiopulmonary symptoms evident.  No acute gastrointestinal complaints.  Patient's appetite and intake are adequate.  Patient's current weight compared with last weight.    Objective   Physical Exam   Constitutional: She is oriented to person, place, and time. She appears well-developed and well-nourished. No distress.   Neurological: She is alert and  "oriented to person, place, and time.   Skin: She is not diaphoretic.   Nursing note and vitals reviewed.    Blood pressure 125/74, pulse 89, height 170.2 cm (67.01\"), weight 93.9 kg (207 lb).    Allergies   Allergen Reactions   • Citalopram    • Dasatinib    • Imatinib    • Lisinopril        Current Medications:   Current Outpatient Prescriptions   Medication Sig Dispense Refill   • ARIPiprazole (ABILIFY) 2 MG tablet Take 1 tablet by mouth Daily. 90 tablet 1   • atorvastatin (LIPITOR) 10 MG tablet      • BOSULIF 100 MG chemo tablet      • diclofenac (VOLTAREN) 1 % gel gel      • gabapentin (NEURONTIN) 100 MG capsule      • HUMALOG KWIKPEN 100 UNIT/ML solution pen-injector      • insulin glargine (LANTUS) 100 UNIT/ML injection Inject  under the skin Daily.     • LANTUS SOLOSTAR 100 UNIT/ML injection pen      • levothyroxine (SYNTHROID, LEVOTHROID) 25 MCG tablet Daily.     • metFORMIN (GLUCOPHAGE) 1000 MG tablet 2 (Two) Times a Day.     • metoprolol tartrate (LOPRESSOR) 25 MG tablet Take 25 mg by mouth 2 (two) times a day.     • oxybutynin (DITROPAN) 5 MG tablet Take 5 mg by mouth daily.     • pantoprazole (PROTONIX) 20 MG EC tablet Daily.     • polyethylene glycol (MIRALAX) packet Take 17 g by mouth Daily. 90 packet 0   • raNITIdine (ZANTAC) 300 MG tablet      • traZODone (DESYREL) 50 MG tablet Take 1 tablet by mouth Every Night. 90 tablet 1   • TRULICITY 0.75 MG/0.5ML solution pen-injector      • desvenlafaxine 25 MG tablet sustained-release 24 hour Take 1 tablet by mouth Daily. 30 tablet 0     No current facility-administered medications for this visit.        Mental Status Exam:   Hygiene:   fair  Cooperation:  Cooperative  Eye Contact:  Good  Psychomotor Behavior:  Appropriate  Affect:  Full range  Hopelessness: 2  Speech:  Normal  Thought Process:  Linear  Thought Content:  Normal  Suicidal:  None  Homicidal:  None  Hallucinations:  None  Delusion:  None  Memory:  Intact  Orientation:  Person, Place, Time and " Situation  Reliability:  fair  Insight:  Fair  Judgement:  Fair  Impulse Control:  Fair  Physical/Medical Issues:  Yes luekemia, dm, GI issues    Assessment/Plan   Diagnoses and all orders for this visit:    Dysthymia  -     desvenlafaxine 25 MG tablet sustained-release 24 hour; Take 1 tablet by mouth Daily.    Generalized anxiety disorder  -     desvenlafaxine 25 MG tablet sustained-release 24 hour; Take 1 tablet by mouth Daily.      Impression: Pt has not been compliant with medications due to not liking the number of pills. She is having exacerbated symptoms of irritability, depression. We discussed medication options and she agrees to try pristiq for depression. Also only to take abilify 4mg at bedtime and stop taking am dose. She continues to show dysthymic symptoms but agrees to be compliant with treatment as discussed.  Patient was instructed on medication side effects, benefits, and also of no treatment. Since she is not continually compliant with medication we will not make any changes at this time and unsure if abilify will help. Counseled on how these types of meds work. Counseled on importance of medication compliance.   Diet was discussed especially healthy diet choices and increasing activity and exercise.  Patient was strongly urged to continue weight maintance or weight loss efforts.  Patient reported verbalized understanding of instructions  She refuses to reschedule psychotherapy with Pia or any other provider at this time.   We discussed risks, benefits, and side effects of the above medication and the patient was agreeable with the plan.  Pt's show significant impairment in multiple important areas of functioning. Her prognosis is fair and patient needs to consider importance of continued treatment and medication compliance.    Return in about 4 weeks (around 3/14/2018), or if symptoms worsen or fail to improve, for Recheck, Next scheduled follow up.  The patient was instructed to call clinic  as needed or go to ER if in crisis.

## 2018-02-20 ENCOUNTER — DOCUMENTATION (OUTPATIENT)
Dept: PSYCHIATRY | Facility: CLINIC | Age: 68
End: 2018-02-20

## 2018-02-20 NOTE — PROGRESS NOTES
[facility] testing results reviewed. Pt placed on Pristiq at most recent visit which is indicated to be appropriate with her metabolism. However, she is also on abilify which was indicated to possibly be needed in lower doses with potential increased risk of SEs and potential gene-drug interaction. Latuda may be better choice for patient when she returns for f/u visit.   It also indicated that trazodone which patient is prescribed for sleep may require lower doses.

## 2018-03-12 DIAGNOSIS — F34.1 DYSTHYMIA: ICD-10-CM

## 2018-03-12 DIAGNOSIS — F41.1 GENERALIZED ANXIETY DISORDER: ICD-10-CM

## 2018-03-12 RX ORDER — DESVENLAFAXINE 25 MG/1
25 TABLET, EXTENDED RELEASE ORAL DAILY
Qty: 30 TABLET | Refills: 0 | Status: SHIPPED | OUTPATIENT
Start: 2018-03-12 | End: 2018-03-22

## 2018-03-20 ENCOUNTER — OFFICE VISIT (OUTPATIENT)
Dept: NEUROSURGERY | Facility: CLINIC | Age: 68
End: 2018-03-20

## 2018-03-20 VITALS — HEIGHT: 67 IN

## 2018-03-20 DIAGNOSIS — M47.816 FACET ARTHRITIS OF LUMBAR REGION: Primary | ICD-10-CM

## 2018-03-20 PROCEDURE — 99203 OFFICE O/P NEW LOW 30 MIN: CPT | Performed by: NEUROLOGICAL SURGERY

## 2018-03-20 NOTE — PROGRESS NOTES
Subjective   Patient ID: Charito Freeman is a 67 y.o. female is being seen for consultation today at the request of Samuel Duane Kreis, MD  Chief Complaint: Low back pain    History of Present Illness: The patient is a 67-year-old woman with a complaint of low back pain with pain radiating at times to her left hip and to her leg.  She injured her back about 2-1/2 years ago when moving a sofa with her  in August 2015.  She has tried physical therapy but it hasn't helped much.  She does not have symptoms of unilateral radiculopathy or neurogenic claudication.  All activities using her back, especially bending down, increase the pain.  She is retired and lives in [place].  She has had bilateral knee replacements.  She has chronic myelogenous leukemia, which she has known about since 2009.    Review of Radiographic Studies:   Lumbar MRI scan shows significant facet arthropathy at L5-S1 on the right, mild facet arthropathy L4-5, mild stenosis and facet arthropathy at L3-4 with a very slight spondylo-    The following portions of the patient's history were reviewed, updated as appropriate and approved: allergies, current medications, past family history, past medical history, past social history, past surgical history, review of systems and problem list.    Review of Systems   Constitutional: Negative for activity change, appetite change, chills, diaphoresis, fatigue, fever and unexpected weight change.   HENT: Negative for congestion, dental problem, drooling, ear discharge, ear pain, facial swelling, hearing loss, mouth sores, nosebleeds, postnasal drip, rhinorrhea, sinus pressure, sneezing, sore throat, tinnitus, trouble swallowing and voice change.    Eyes: Negative for photophobia, pain, discharge, redness, itching and visual disturbance.   Respiratory: Negative for apnea, cough, choking, chest tightness, shortness of breath, wheezing and stridor.    Cardiovascular: Negative for chest pain, palpitations and leg  swelling.   Gastrointestinal: Negative for abdominal distention, abdominal pain, anal bleeding, blood in stool, constipation, diarrhea, nausea, rectal pain and vomiting.   Endocrine: Negative for cold intolerance, heat intolerance, polydipsia, polyphagia and polyuria.   Genitourinary: Negative for decreased urine volume, difficulty urinating, dysuria, enuresis, flank pain, frequency, genital sores, hematuria and urgency.   Musculoskeletal: Positive for arthralgias, back pain, myalgias, neck pain and neck stiffness. Negative for gait problem and joint swelling.   Skin: Negative for color change, pallor, rash and wound.   Allergic/Immunologic: Negative for environmental allergies, food allergies and immunocompromised state.   Neurological: Positive for dizziness, weakness and light-headedness. Negative for tremors, seizures, syncope, facial asymmetry, speech difficulty, numbness and headaches.   Hematological: Negative for adenopathy. Does not bruise/bleed easily.   Psychiatric/Behavioral: Negative for agitation, behavioral problems, confusion, decreased concentration, dysphoric mood, hallucinations, self-injury, sleep disturbance and suicidal ideas. The patient is not nervous/anxious and is not hyperactive.        Objective     NEUROLOGICAL EXAMINATION:      MENTAL STATUS:  Alert and oriented.  Speech intact.  Recent and remote memory intact.      CRANIAL NERVES:  Cranial nerve II:  Visual fields are full.  Cranial nerves III, IV and VI:  PERRLADC.  Extraocular movements are intact.  Nystagmus is not present.  Cranial nerve V:  Facial sensation is intact.  Cranial nerve VII:  Muscles of facial expression reveal no asymmetry.  Cranial nerve VIII:  Hearing is intact.  Cranial nerves IX and X:  Palate elevates symmetrically.  Cranial nerve XI:  Shoulder shrug is intact.  Cranial nerve XII:  Tongue is midline without evidence of atrophy or fasciculation.    MUSCULOSKELETAL:  SLR negative. Brandon's test  negative.    MOTOR:  Deltoid, biceps, triceps, and  strength intact.  No hand atrophy.  Hip flexion, knee extension, ankle dorsiflexion and plantar flexion intact. No tremor, spasticity, ataxia, or dysmetria.    SENSATION:  Intact to touch upper and lower extremities.  Position sense intact.    REFLEXES:  DTR intact and symmetrical in upper and lower extremities.      Assessment   Facet arthropathy L3-4, L4-5, L5-S1, minimal stenosis with very minimal spondylolisthesis L3-4.  There are no surgical indications.       Plan   Recommend referral to pain management for attempt at facet injection therapy or epidural steroid injection therapy.  She is not a surgical candidate.       See Sanders MD

## 2018-03-22 ENCOUNTER — OFFICE VISIT (OUTPATIENT)
Dept: PSYCHIATRY | Facility: CLINIC | Age: 68
End: 2018-03-22

## 2018-03-22 VITALS
SYSTOLIC BLOOD PRESSURE: 118 MMHG | WEIGHT: 204 LBS | DIASTOLIC BLOOD PRESSURE: 72 MMHG | HEART RATE: 98 BPM | HEIGHT: 67 IN | BODY MASS INDEX: 32.02 KG/M2

## 2018-03-22 DIAGNOSIS — F34.1 DYSTHYMIA: Primary | ICD-10-CM

## 2018-03-22 DIAGNOSIS — F41.1 GENERALIZED ANXIETY DISORDER: ICD-10-CM

## 2018-03-22 DIAGNOSIS — F51.05 INSOMNIA DUE TO MENTAL CONDITION: ICD-10-CM

## 2018-03-22 PROCEDURE — 99214 OFFICE O/P EST MOD 30 MIN: CPT | Performed by: NURSE PRACTITIONER

## 2018-03-22 RX ORDER — TRAZODONE HYDROCHLORIDE 50 MG/1
50 TABLET ORAL NIGHTLY
Qty: 90 TABLET | Refills: 0 | Status: SHIPPED | OUTPATIENT
Start: 2018-03-22 | End: 2018-04-16 | Stop reason: SDUPTHER

## 2018-03-22 RX ORDER — DESVENLAFAXINE SUCCINATE 50 MG/1
50 TABLET, EXTENDED RELEASE ORAL EVERY MORNING
Qty: 30 TABLET | Refills: 0 | Status: SHIPPED | OUTPATIENT
Start: 2018-03-22 | End: 2018-04-16 | Stop reason: SDUPTHER

## 2018-03-22 RX ORDER — LAMOTRIGINE 25 MG/1
25 TABLET ORAL EVERY MORNING
Qty: 30 TABLET | Refills: 0 | Status: SHIPPED | OUTPATIENT
Start: 2018-03-22 | End: 2018-04-16 | Stop reason: SDUPTHER

## 2018-03-22 NOTE — PROGRESS NOTES
"Subjective   Charito Freeman is a 67 y.o. female who is here today for medication management follow up.    Chief Complaint: HPI:  History of Present Illness   She went to neurologist for herniated disc and was told it was a bad case of arthritis with nothing that can be done except pain management and she is unsure what she will do \"it hurts pretty bad.\" Depression 7 and anxiety 0 with 10 worst. She is fatigued and shares \"I have to make myself stay calm. She has a hard time getting out of bed in morning and doing house work. She needs to get outside and do something gardening but hasn't been motivated to do that either. She shares she use to be very tidy always keeping house clean and yard mowed, but over the past 10 years her depression significantly has worsened. Denies any thoughts of SI/HI/AH/VH. Trazodone helps her sleep. She feels that she is a bother to most people which is why she doesn't reach out to friends. She has isolated and continues to be withdrawn, no energy. She is irritable and would like not to feel this way. Reports hard of hearing and needing to get test to check on this done. She states she plans to do this soon. Reports not liking being at home by herself that it gives her comfort to have  sitting beside her. States her 50th wedding anniversary is in the near future and it bothers her that her children will probably not be present or visit at this time. She says her daughter is much like herself, depressed, stubborn, and irritable.      The following portions of the patient's history were reviewed and updated as appropriate: allergies, current medications, past family history, past medical history, past social history, past surgical history and problem list.    Review of Systems   Constitutional: Positive for fatigue. Negative for activity change and appetite change.   HENT: Negative.  Negative for congestion and sinus pressure.    Eyes: Negative for visual disturbance.   Respiratory: " "Negative.    Cardiovascular: Negative.    Gastrointestinal: Negative for constipation and nausea.   Endocrine: Negative.    Genitourinary: Negative.    Musculoskeletal: Positive for arthralgias, back pain and myalgias.   Skin: Negative.    Allergic/Immunologic: Negative.    Neurological: Positive for numbness. Negative for dizziness, seizures and headaches.        In left arm r/t bulging disc    Hematological: Negative.    Psychiatric/Behavioral: Positive for agitation, confusion, decreased concentration, dysphoric mood and sleep disturbance. Negative for behavioral problems, hallucinations, self-injury and suicidal ideas. The patient is not nervous/anxious and is not hyperactive.      Patient denies any recent medical illnesses.  No acute cardiopulmonary symptoms evident.  No acute gastrointestinal complaints.  Patient's appetite and intake are adequate.  Patient's current weight compared with last weight.    Objective   Physical Exam   Constitutional: She is oriented to person, place, and time. She appears well-developed and well-nourished. No distress.   Neurological: She is alert and oriented to person, place, and time.   Skin: She is not diaphoretic.   Nursing note and vitals reviewed.    Blood pressure 118/72, pulse 98, height 170.2 cm (67.01\"), weight 92.5 kg (204 lb).    Allergies   Allergen Reactions   • Citalopram    • Dasatinib    • Imatinib    • Lisinopril        Current Medications:   Current Outpatient Prescriptions   Medication Sig Dispense Refill   • atorvastatin (LIPITOR) 10 MG tablet      • BOSULIF 100 MG chemo tablet      • diclofenac (VOLTAREN) 1 % gel gel      • gabapentin (NEURONTIN) 100 MG capsule      • HUMALOG KWIKPEN 100 UNIT/ML solution pen-injector      • insulin glargine (LANTUS) 100 UNIT/ML injection Inject  under the skin Daily.     • LANTUS SOLOSTAR 100 UNIT/ML injection pen      • levothyroxine (SYNTHROID, LEVOTHROID) 25 MCG tablet Daily.     • metFORMIN (GLUCOPHAGE) 1000 MG tablet 2 " (Two) Times a Day.     • metoprolol tartrate (LOPRESSOR) 25 MG tablet Take 25 mg by mouth 2 (two) times a day.     • oxybutynin (DITROPAN) 5 MG tablet Take 5 mg by mouth daily.     • pantoprazole (PROTONIX) 20 MG EC tablet Daily.     • polyethylene glycol (MIRALAX) packet Take 17 g by mouth Daily. 90 packet 0   • raNITIdine (ZANTAC) 300 MG tablet      • traZODone (DESYREL) 50 MG tablet Take 1 tablet by mouth Every Night. 90 tablet 0   • TRULICITY 0.75 MG/0.5ML solution pen-injector      • desvenlafaxine (PRISTIQ) 50 MG 24 hr tablet Take 1 tablet by mouth Every Morning. 30 tablet 0   • lamoTRIgine (LAMICTAL) 25 MG tablet Take 1 tablet by mouth Every Morning. 30 tablet 0     No current facility-administered medications for this visit.        Mental Status Exam:   Hygiene:   fair  Cooperation:  Cooperative  Eye Contact:  Good  Psychomotor Behavior:  Appropriate  Affect:  Full range  Hopelessness: 2  Speech:  Normal  Thought Process:  Linear  Thought Content:  Normal  Suicidal:  None  Homicidal:  None  Hallucinations:  None  Delusion:  None  Memory:  Intact  Orientation:  Person, Place, Time and Situation  Reliability:  fair  Insight:  Fair  Judgement:  Fair  Impulse Control:  Fair  Physical/Medical Issues:  Yes luekemia, dm, GI issues    We reviewed results of [facility] testing. We will DC abilify and begin lamictal for irritability.       Assessment/Plan   Diagnoses and all orders for this visit:    Dysthymia  -     desvenlafaxine (PRISTIQ) 50 MG 24 hr tablet; Take 1 tablet by mouth Every Morning.  -     traZODone (DESYREL) 50 MG tablet; Take 1 tablet by mouth Every Night.    Generalized anxiety disorder  -     desvenlafaxine (PRISTIQ) 50 MG 24 hr tablet; Take 1 tablet by mouth Every Morning.    Insomnia due to mental condition  -     traZODone (DESYREL) 50 MG tablet; Take 1 tablet by mouth Every Night.    Other orders  -     lamoTRIgine (LAMICTAL) 25 MG tablet; Take 1 tablet by mouth Every Morning.      Impression:  She continues to have exacerbated symptoms of irritability, depression. We discussed medication options and she agrees to increase pristiq for depression.  She understands to discontinue Abilify.  Prior to today's visit she had only been taking 2 mg in the morning of Abilify.  She continues to show dysthymic symptoms but agrees to be compliant with treatment as discussed.  Patient was instructed on medication side effects, benefits, and also of no treatment. BMI 31.9 reviewed with patient. Diet was discussed especially healthy diet choices and increasing activity and exercise.  Patient was strongly urged to continue weight maintance or weight loss efforts.  Patient reported verbalized understanding of instructions  She refuses to reschedule psychotherapy with Pia or any other provider at this time.   We discussed risks, benefits, and side effects of the above medication and the patient was agreeable with the plan.  Pt's show significant impairment in multiple important areas of functioning. Her prognosis is fair and patient needs to consider importance of continued treatment and medication compliance.    Return in about 4 weeks (around 4/19/2018), or if symptoms worsen or fail to improve, for Recheck, Next scheduled follow up.  The patient was instructed to call clinic as needed or go to ER if in crisis.

## 2018-04-09 DIAGNOSIS — F41.1 GENERALIZED ANXIETY DISORDER: ICD-10-CM

## 2018-04-09 DIAGNOSIS — F34.1 DYSTHYMIA: ICD-10-CM

## 2018-04-09 RX ORDER — DESVENLAFAXINE 25 MG/1
TABLET, EXTENDED RELEASE ORAL
Qty: 30 TABLET | Refills: 0 | OUTPATIENT
Start: 2018-04-09

## 2018-04-09 NOTE — TELEPHONE ENCOUNTER
I had a request for refill on 25mg pristiq. I sent in 50mg pristiq at her last visit on 3/22. Please let pt know it should be available at [facility] to . If not there then we can resend. Thanks.

## 2018-04-16 ENCOUNTER — OFFICE VISIT (OUTPATIENT)
Dept: PSYCHIATRY | Facility: CLINIC | Age: 68
End: 2018-04-16

## 2018-04-16 VITALS
DIASTOLIC BLOOD PRESSURE: 80 MMHG | HEIGHT: 67 IN | WEIGHT: 207 LBS | SYSTOLIC BLOOD PRESSURE: 116 MMHG | HEART RATE: 101 BPM | BODY MASS INDEX: 32.49 KG/M2

## 2018-04-16 DIAGNOSIS — F41.1 GENERALIZED ANXIETY DISORDER: ICD-10-CM

## 2018-04-16 DIAGNOSIS — F51.05 INSOMNIA DUE TO MENTAL CONDITION: ICD-10-CM

## 2018-04-16 DIAGNOSIS — F34.1 DYSTHYMIA: ICD-10-CM

## 2018-04-16 PROCEDURE — 90833 PSYTX W PT W E/M 30 MIN: CPT | Performed by: NURSE PRACTITIONER

## 2018-04-16 PROCEDURE — 99214 OFFICE O/P EST MOD 30 MIN: CPT | Performed by: NURSE PRACTITIONER

## 2018-04-16 RX ORDER — TRAZODONE HYDROCHLORIDE 100 MG/1
100-150 TABLET ORAL NIGHTLY
Qty: 135 TABLET | Refills: 0 | Status: SHIPPED | OUTPATIENT
Start: 2018-04-16 | End: 2018-08-28

## 2018-04-16 RX ORDER — DESVENLAFAXINE SUCCINATE 50 MG/1
50 TABLET, EXTENDED RELEASE ORAL EVERY MORNING
Qty: 90 TABLET | Refills: 0 | Status: SHIPPED | OUTPATIENT
Start: 2018-04-16 | End: 2018-08-28

## 2018-04-16 RX ORDER — LAMOTRIGINE 25 MG/1
25 TABLET ORAL EVERY MORNING
Qty: 90 TABLET | Refills: 0 | Status: SHIPPED | OUTPATIENT
Start: 2018-04-16 | End: 2018-07-24 | Stop reason: SDUPTHER

## 2018-04-16 NOTE — PROGRESS NOTES
"Subjective   Charito Freeman is a 67 y.o. female who is here today for medication management follow up.    Chief Complaint: i've been going outside more     HPI:  History of Present Illness   She reports medicine has been helping as she seems to have more motivation and finds things easier to accomplish. Reports current rainy/cold weather has increased her back pain and arthritis. Left Arm still having numbness. Reports depression is improving but ongoing. Acknowledges her diet still needs improvement, hasn't been complying with diabetes management. Reports ongoing anxiety about her relationship with son and daughter. Attempts to suppress her emotions/feelings. Stressors include family issues.  Her grandson is due to graduate from high school next month and she initially wanted to go but when asking her daughter about handicap seating, her daughter didn't want to deal with this.  Patient takes this as they don't want her to go and that she is aggravating.  Patient does not like talking about relationships with her son and daughter, becomes impulsive and states she was going to sell her house and property which she has lived in for 20 years and not give either child inherited rights. Continues to say that they have no ties around here, even though her children/grandchildren both live in areas.  States her son and daughter rarely come around to visit and she doesn't understand why.  Reports she is helped them out with financial issues several times in the past. States her son only comes on Sunday to eat dinner and she was needing help moving a new sofa from truck bed in garage into home and he said he couldn't/wouldn't do it due to his back issues. She gets irritable when talking about family not understanding why her son and daughter \"feel we are beneath them.\" Denies any SI/HI/AH/VH.  Patient attempts to change the subject when discussing relationship with her two children. She is sleeping well but has had to increase " "trazodone to 100mg. She still needs to get her hearing checked.   No other stressors or medical concerns to address at this time. Appetite is good, no significant weight changes.     The following portions of the patient's history were reviewed and updated as appropriate: allergies, current medications, past family history, past medical history, past social history, past surgical history and problem list.    Review of Systems   Constitutional: Negative for activity change, appetite change and fatigue.   HENT: Negative.  Negative for congestion and sinus pressure.    Eyes: Negative for visual disturbance.   Respiratory: Negative.    Cardiovascular: Negative.    Gastrointestinal: Negative for constipation and nausea.   Endocrine: Negative.    Genitourinary: Negative.    Musculoskeletal: Positive for arthralgias, back pain and myalgias.   Skin: Negative.    Allergic/Immunologic: Negative.    Neurological: Positive for numbness. Negative for dizziness, seizures and headaches.        In left arm r/t bulging disc    Hematological: Negative.    Psychiatric/Behavioral: Positive for agitation and dysphoric mood. Negative for behavioral problems, confusion, decreased concentration, hallucinations, self-injury, sleep disturbance and suicidal ideas. The patient is nervous/anxious. The patient is not hyperactive.      Patient denies any recent medical illnesses.  No acute cardiopulmonary symptoms evident.  No acute gastrointestinal complaints.  Patient's appetite and intake are adequate.  Patient's current weight compared with last weight.    Objective   Physical Exam   Constitutional: She is oriented to person, place, and time. She appears well-developed and well-nourished. No distress.   Neurological: She is alert and oriented to person, place, and time.   Skin: She is not diaphoretic.   Nursing note and vitals reviewed.    Blood pressure 116/80, pulse 101, height 170.2 cm (67.01\"), weight 93.9 kg (207 lb).    Allergies "   Allergen Reactions   • Citalopram    • Dasatinib    • Imatinib    • Lisinopril        Current Medications:   Current Outpatient Prescriptions   Medication Sig Dispense Refill   • atorvastatin (LIPITOR) 10 MG tablet      • BOSULIF 100 MG chemo tablet      • desvenlafaxine (PRISTIQ) 50 MG 24 hr tablet Take 1 tablet by mouth Every Morning. 90 tablet 0   • diclofenac (VOLTAREN) 1 % gel gel      • gabapentin (NEURONTIN) 100 MG capsule      • HUMALOG KWIKPEN 100 UNIT/ML solution pen-injector      • insulin glargine (LANTUS) 100 UNIT/ML injection Inject  under the skin Daily.     • lamoTRIgine (LAMICTAL) 25 MG tablet Take 1 tablet by mouth Every Morning. 90 tablet 0   • levothyroxine (SYNTHROID, LEVOTHROID) 25 MCG tablet Daily.     • metFORMIN (GLUCOPHAGE) 1000 MG tablet 2 (Two) Times a Day.     • metoprolol tartrate (LOPRESSOR) 25 MG tablet Take 25 mg by mouth 2 (two) times a day.     • oxybutynin (DITROPAN) 5 MG tablet Take 5 mg by mouth daily.     • pantoprazole (PROTONIX) 20 MG EC tablet Daily.     • raNITIdine (ZANTAC) 300 MG tablet      • traZODone (DESYREL) 100 MG tablet Take 1-1.5 tablets by mouth Every Night. 135 tablet 0   • LANTUS SOLOSTAR 100 UNIT/ML injection pen      • polyethylene glycol (MIRALAX) packet Take 17 g by mouth Daily. 90 packet 0   • TRULICITY 0.75 MG/0.5ML solution pen-injector        No current facility-administered medications for this visit.        Mental Status Exam:   Hygiene:   fair  Cooperation:  Cooperative  Eye Contact:  Good  Psychomotor Behavior:  Appropriate  Affect:  Full range  Hopelessness: 2  Speech:  Normal  Thought Process:  Linear  Thought Content:  Normal  Suicidal:  None  Homicidal:  None  Hallucinations:  None  Delusion:  None  Memory:  Intact  Orientation:  Person, Place, Time and Situation  Reliability:  fair  Insight:  Fair  Judgement:  Fair  Impulse Control:  Fair  Physical/Medical Issues:  Yes luekemia, dm, GI issues          Assessment/Plan   Diagnoses and all  orders for this visit:    Dysthymia  -     desvenlafaxine (PRISTIQ) 50 MG 24 hr tablet; Take 1 tablet by mouth Every Morning.  -     traZODone (DESYREL) 100 MG tablet; Take 1-1.5 tablets by mouth Every Night.  -     lamoTRIgine (LAMICTAL) 25 MG tablet; Take 1 tablet by mouth Every Morning.    Generalized anxiety disorder  -     desvenlafaxine (PRISTIQ) 50 MG 24 hr tablet; Take 1 tablet by mouth Every Morning.    Insomnia due to mental condition  -     traZODone (DESYREL) 100 MG tablet; Take 1-1.5 tablets by mouth Every Night.      Impression: She continues to have some minor symptoms of irritability and anxiety and dysphoric mood. Overall, her depression has improved and she is sleeping better. We discussed medication options and she reports wanting to stay where she is on current doses of meds but will consider increasing as she can tell they help improve her mood. Patient was instructed on medication side effects, benefits, and also of no treatment. BMI 32.4 reviewed with patient. Diet was discussed especially healthy diet choices and increasing activity and exercise.  Patient was strongly urged to continue weight maintance or weight loss efforts.  Patient reported verbalized understanding of these instructions  She again refuses to reschedule psychotherapy with Pia or any other provider at this time.   We discussed risks, benefits, and side effects of the above medication and the patient was agreeable with the plan.  Pt's show significant impairment in multiple important areas of functioning. Her prognosis is fair and patient needs to consider importance of continued treatment and medication compliance.    From 11:30am-12:00pm patient given supportive psychotherapy utilizing CBT techniques.  Attempted to reframe patient's negative thinking especially about grandsons graduation.  She reports not being wanted to go, but when rationalizing it is about her grandson and not about her daughter and that he would be  proud for her to be present, she was able to rethink the situation.  Further, patient encouraged to call [employer] inquiring about graduation and further find out about handicap seating so that she would be there in support of grandson. Encouraged mindfulness and to focus on more positive attributes of the situation.  Discussion on being responsible for her own behaviors and actions and not be responsible for others behaviors and actions.  Patient seemed receptive to this discussion  Patient was allowed to express emotions and feelings and was validated.  Encouraged her to reach out to other family friends in getting help moving the new sofa into her home.  Patient acknowledges being stubborn at times.     Return in about 8 weeks (around 6/11/2018), or if symptoms worsen or fail to improve, for Recheck, Next scheduled follow up.  The patient was instructed to call clinic as needed or go to ER if in crisis.

## 2018-04-19 DIAGNOSIS — F34.1 DYSTHYMIA: ICD-10-CM

## 2018-04-19 DIAGNOSIS — F41.1 GENERALIZED ANXIETY DISORDER: ICD-10-CM

## 2018-04-19 RX ORDER — DESVENLAFAXINE SUCCINATE 50 MG/1
TABLET, EXTENDED RELEASE ORAL
Qty: 30 TABLET | Refills: 0 | OUTPATIENT
Start: 2018-04-19

## 2018-04-19 RX ORDER — LAMOTRIGINE 25 MG/1
TABLET ORAL
Qty: 30 TABLET | Refills: 0 | OUTPATIENT
Start: 2018-04-19

## 2018-05-07 RX ORDER — TRAZODONE HYDROCHLORIDE 50 MG/1
TABLET ORAL
Qty: 90 TABLET | OUTPATIENT
Start: 2018-05-07

## 2018-06-07 ENCOUNTER — HOSPITAL ENCOUNTER (EMERGENCY)
Facility: HOSPITAL | Age: 68
Discharge: HOME | End: 2018-06-07
Attending: EMERGENCY MEDICINE | Admitting: EMERGENCY MEDICINE

## 2018-06-07 ENCOUNTER — APPOINTMENT (OUTPATIENT)
Dept: GENERAL RADIOLOGY | Facility: HOSPITAL | Age: 68
End: 2018-06-07

## 2018-06-07 VITALS
BODY MASS INDEX: 32.18 KG/M2 | DIASTOLIC BLOOD PRESSURE: 76 MMHG | HEART RATE: 92 BPM | HEIGHT: 67 IN | SYSTOLIC BLOOD PRESSURE: 116 MMHG | OXYGEN SATURATION: 97 % | TEMPERATURE: 97.6 F | WEIGHT: 205 LBS | RESPIRATION RATE: 18 BRPM

## 2018-06-07 DIAGNOSIS — R73.9 HYPERGLYCEMIA: Primary | ICD-10-CM

## 2018-06-07 LAB
ALBUMIN SERPL-MCNC: 5.1 G/DL (ref 3.4–4.8)
ALBUMIN/GLOB SERPL: 1.7 G/DL (ref 1.5–2.5)
ALP SERPL-CCNC: 87 U/L (ref 35–104)
ALT SERPL W P-5'-P-CCNC: 19 U/L (ref 10–36)
ANION GAP SERPL CALCULATED.3IONS-SCNC: 2.8 MMOL/L (ref 3.6–11.2)
AST SERPL-CCNC: 16 U/L (ref 10–30)
BASOPHILS # BLD AUTO: 0.07 10*3/MM3 (ref 0–0.3)
BASOPHILS NFR BLD AUTO: 0.6 % (ref 0–2)
BILIRUB SERPL-MCNC: 0.3 MG/DL (ref 0.2–1.8)
BILIRUB UR QL STRIP: NEGATIVE
BUN BLD-MCNC: 18 MG/DL (ref 7–21)
BUN/CREAT SERPL: 19.1 (ref 7–25)
CALCIUM SPEC-SCNC: 9.7 MG/DL (ref 7.7–10)
CHLORIDE SERPL-SCNC: 102 MMOL/L (ref 99–112)
CLARITY UR: CLEAR
CO2 SERPL-SCNC: 29.2 MMOL/L (ref 24.3–31.9)
COLOR UR: YELLOW
CREAT BLD-MCNC: 0.94 MG/DL (ref 0.43–1.29)
DEPRECATED RDW RBC AUTO: 50.3 FL (ref 37–54)
EOSINOPHIL # BLD AUTO: 0.21 10*3/MM3 (ref 0–0.7)
EOSINOPHIL NFR BLD AUTO: 1.7 % (ref 0–7)
ERYTHROCYTE [DISTWIDTH] IN BLOOD BY AUTOMATED COUNT: 17.5 % (ref 11.5–14.5)
GFR SERPL CREATININE-BSD FRML MDRD: 59 ML/MIN/1.73
GLOBULIN UR ELPH-MCNC: 3 GM/DL
GLUCOSE BLD-MCNC: 178 MG/DL (ref 70–110)
GLUCOSE UR STRIP-MCNC: NEGATIVE MG/DL
HCT VFR BLD AUTO: 35 % (ref 37–47)
HGB BLD-MCNC: 11.2 G/DL (ref 12–16)
HGB UR QL STRIP.AUTO: NEGATIVE
IMM GRANULOCYTES # BLD: 0.07 10*3/MM3 (ref 0–0.03)
IMM GRANULOCYTES NFR BLD: 0.6 % (ref 0–0.5)
KETONES UR QL STRIP: NEGATIVE
LEUKOCYTE ESTERASE UR QL STRIP.AUTO: NEGATIVE
LYMPHOCYTES # BLD AUTO: 2.3 10*3/MM3 (ref 1–3)
LYMPHOCYTES NFR BLD AUTO: 18.5 % (ref 16–46)
MCH RBC QN AUTO: 26 PG (ref 27–33)
MCHC RBC AUTO-ENTMCNC: 32 G/DL (ref 33–37)
MCV RBC AUTO: 81.2 FL (ref 80–94)
MONOCYTES # BLD AUTO: 1.09 10*3/MM3 (ref 0.1–0.9)
MONOCYTES NFR BLD AUTO: 8.8 % (ref 0–12)
NEUTROPHILS # BLD AUTO: 8.71 10*3/MM3 (ref 1.4–6.5)
NEUTROPHILS NFR BLD AUTO: 69.8 % (ref 40–75)
NITRITE UR QL STRIP: NEGATIVE
OSMOLALITY SERPL CALC.SUM OF ELEC: 274.6 MOSM/KG (ref 273–305)
PH UR STRIP.AUTO: <=5 [PH] (ref 5–8)
PLATELET # BLD AUTO: 286 10*3/MM3 (ref 130–400)
PMV BLD AUTO: 10.9 FL (ref 6–10)
POTASSIUM BLD-SCNC: 4.2 MMOL/L (ref 3.5–5.3)
PROT SERPL-MCNC: 8.1 G/DL (ref 6–8)
PROT UR QL STRIP: NEGATIVE
RBC # BLD AUTO: 4.31 10*6/MM3 (ref 4.2–5.4)
SODIUM BLD-SCNC: 134 MMOL/L (ref 135–153)
SP GR UR STRIP: 1.02 (ref 1–1.03)
UROBILINOGEN UR QL STRIP: NORMAL
WBC NRBC COR # BLD: 12.45 10*3/MM3 (ref 4.5–12.5)

## 2018-06-07 PROCEDURE — 71046 X-RAY EXAM CHEST 2 VIEWS: CPT

## 2018-06-07 PROCEDURE — 81003 URINALYSIS AUTO W/O SCOPE: CPT | Performed by: EMERGENCY MEDICINE

## 2018-06-07 PROCEDURE — 71046 X-RAY EXAM CHEST 2 VIEWS: CPT | Performed by: RADIOLOGY

## 2018-06-07 PROCEDURE — 85025 COMPLETE CBC W/AUTO DIFF WBC: CPT | Performed by: EMERGENCY MEDICINE

## 2018-06-07 PROCEDURE — 99283 EMERGENCY DEPT VISIT LOW MDM: CPT

## 2018-06-07 PROCEDURE — 80053 COMPREHEN METABOLIC PANEL: CPT | Performed by: EMERGENCY MEDICINE

## 2018-06-07 NOTE — ED NOTES
Pt resting comfortably,  at bedside. VSS. Discharge ambulatory, no acute distress noted.     Hipolito Crystal RN  06/07/18 [id number]

## 2018-06-07 NOTE — ED NOTES
Pt reports that she has not been feeling well, was diagnosed with bronchitis and has been continually feeling worse.  Pt family at bedside.     Zahraa Stoll RN  06/07/18 [id number]

## 2018-06-07 NOTE — ED NOTES
Pt resting quietly on stretcher with no complaints.  Pt AAOx4 with no resp distress noted, respirations even and unlabored.  Pt denies any needs at this time.  Skin PWD.  Pt family at bedside. Will continue to monitor and follow plan of care.  Bed rails up x2, bed in lowest position, call light in reach.       Zahraa Stoll RN  06/07/18 [id number]

## 2018-06-08 ENCOUNTER — EPISODE CHANGES (OUTPATIENT)
Dept: CASE MANAGEMENT | Facility: OTHER | Age: 68
End: 2018-06-08

## 2018-06-09 NOTE — ED PROVIDER NOTES
Subjective   Patient complains of fatigue, and cough for 2 days.        Weakness - Generalized   Severity:  Moderate  Onset quality:  Gradual  Duration:  2 days  Timing:  Constant  Progression:  Waxing and waning  Chronicity:  Recurrent  Relieved by:  Nothing  Worsened by:  Nothing  Ineffective treatments:  None tried  Associated symptoms: cough and shortness of breath        Review of Systems   Constitutional: Positive for activity change, appetite change and fatigue.   HENT: Negative.    Eyes: Negative.    Respiratory: Positive for cough and shortness of breath.    Cardiovascular: Negative.    Gastrointestinal: Negative.    Endocrine: Negative.    Genitourinary: Negative.    Musculoskeletal: Negative.    Skin: Negative.    Allergic/Immunologic: Negative.    Hematological: Negative.    Psychiatric/Behavioral: Negative.        Past Medical History:   Diagnosis Date   • Anxiety    • Depression    • Disease of thyroid gland    • DM (diabetes mellitus screen)    • GERD (gastroesophageal reflux disease)    • Hypertension        Allergies   Allergen Reactions   • Citalopram    • Dasatinib    • Imatinib    • Lisinopril        Past Surgical History:   Procedure Laterality Date   • HYSTERECTOMY     • REPLACEMENT TOTAL KNEE         Family History   Problem Relation Age of Onset   • Family history unknown: Yes       Social History     Social History   • Marital status:      Social History Main Topics   • Smoking status: Never Smoker   • Smokeless tobacco: Never Used   • Alcohol use No   • Drug use: No   • Sexual activity: Defer     Other Topics Concern   • Not on file           Objective   Physical Exam   Constitutional: She appears well-developed and well-nourished.   HENT:   Head: Normocephalic.   Eyes: EOM are normal.   Neck: Normal range of motion.   Cardiovascular: Normal rate and regular rhythm.    Pulmonary/Chest: She has wheezes.   Abdominal: Soft.   Musculoskeletal: Normal range of motion.   Neurological: She  is alert.   Skin: Skin is warm.   Psychiatric: She has a normal mood and affect.   Nursing note and vitals reviewed.      Procedures           ED Course                  MDM      Final diagnoses:   Hyperglycemia            Nael Richard MD  06/09/18 0893

## 2018-06-22 ENCOUNTER — APPOINTMENT (OUTPATIENT)
Dept: GENERAL RADIOLOGY | Facility: HOSPITAL | Age: 68
End: 2018-06-22

## 2018-06-22 ENCOUNTER — HOSPITAL ENCOUNTER (EMERGENCY)
Facility: HOSPITAL | Age: 68
Discharge: HOME | End: 2018-06-22
Attending: EMERGENCY MEDICINE | Admitting: EMERGENCY MEDICINE

## 2018-06-22 ENCOUNTER — APPOINTMENT (OUTPATIENT)
Dept: CT IMAGING | Facility: HOSPITAL | Age: 68
End: 2018-06-22

## 2018-06-22 VITALS
HEART RATE: 72 BPM | BODY MASS INDEX: 32.96 KG/M2 | DIASTOLIC BLOOD PRESSURE: 73 MMHG | WEIGHT: 210 LBS | RESPIRATION RATE: 20 BRPM | TEMPERATURE: 97.8 F | HEIGHT: 67 IN | SYSTOLIC BLOOD PRESSURE: 125 MMHG | OXYGEN SATURATION: 98 %

## 2018-06-22 DIAGNOSIS — J45.998 CHRONIC ALLERGIC BRONCHITIS: Primary | ICD-10-CM

## 2018-06-22 DIAGNOSIS — K59.00 OBSTIPATION: ICD-10-CM

## 2018-06-22 LAB
ALBUMIN SERPL-MCNC: 4.1 G/DL (ref 3.4–4.8)
ALBUMIN/GLOB SERPL: 1.5 G/DL (ref 1.5–2.5)
ALP SERPL-CCNC: 69 U/L (ref 35–104)
ALT SERPL W P-5'-P-CCNC: 16 U/L (ref 10–36)
AMYLASE SERPL-CCNC: 11 U/L (ref 28–100)
ANION GAP SERPL CALCULATED.3IONS-SCNC: 6.7 MMOL/L (ref 3.6–11.2)
AST SERPL-CCNC: 43 U/L (ref 10–30)
BACTERIA UR QL AUTO: ABNORMAL /HPF
BASOPHILS # BLD AUTO: 0.05 10*3/MM3 (ref 0–0.3)
BASOPHILS NFR BLD AUTO: 0.5 % (ref 0–2)
BILIRUB SERPL-MCNC: 0.2 MG/DL (ref 0.2–1.8)
BILIRUB UR QL STRIP: NEGATIVE
BUN BLD-MCNC: 12 MG/DL (ref 7–21)
BUN/CREAT SERPL: 11.9 (ref 7–25)
CALCIUM SPEC-SCNC: 9.2 MG/DL (ref 7.7–10)
CHLORIDE SERPL-SCNC: 104 MMOL/L (ref 99–112)
CLARITY UR: ABNORMAL
CO2 SERPL-SCNC: 23.3 MMOL/L (ref 24.3–31.9)
COLOR UR: YELLOW
CREAT BLD-MCNC: 1.01 MG/DL (ref 0.43–1.29)
DEPRECATED RDW RBC AUTO: 51.1 FL (ref 37–54)
EOSINOPHIL # BLD AUTO: 0.27 10*3/MM3 (ref 0–0.7)
EOSINOPHIL NFR BLD AUTO: 2.6 % (ref 0–7)
ERYTHROCYTE [DISTWIDTH] IN BLOOD BY AUTOMATED COUNT: 17.4 % (ref 11.5–14.5)
GFR SERPL CREATININE-BSD FRML MDRD: 55 ML/MIN/1.73
GLOBULIN UR ELPH-MCNC: 2.7 GM/DL
GLUCOSE BLD-MCNC: 183 MG/DL (ref 70–110)
GLUCOSE UR STRIP-MCNC: NEGATIVE MG/DL
HCT VFR BLD AUTO: 29.7 % (ref 37–47)
HGB BLD-MCNC: 9.6 G/DL (ref 12–16)
HGB UR QL STRIP.AUTO: NEGATIVE
HYALINE CASTS UR QL AUTO: ABNORMAL /LPF
IMM GRANULOCYTES # BLD: 0.07 10*3/MM3 (ref 0–0.03)
IMM GRANULOCYTES NFR BLD: 0.7 % (ref 0–0.5)
KETONES UR QL STRIP: NEGATIVE
LEUKOCYTE ESTERASE UR QL STRIP.AUTO: ABNORMAL
LIPASE SERPL-CCNC: 35 U/L (ref 13–60)
LYMPHOCYTES # BLD AUTO: 2.31 10*3/MM3 (ref 1–3)
LYMPHOCYTES NFR BLD AUTO: 22 % (ref 16–46)
MCH RBC QN AUTO: 26.7 PG (ref 27–33)
MCHC RBC AUTO-ENTMCNC: 32.3 G/DL (ref 33–37)
MCV RBC AUTO: 82.7 FL (ref 80–94)
MONOCYTES # BLD AUTO: 0.72 10*3/MM3 (ref 0.1–0.9)
MONOCYTES NFR BLD AUTO: 6.9 % (ref 0–12)
NEUTROPHILS # BLD AUTO: 7.08 10*3/MM3 (ref 1.4–6.5)
NEUTROPHILS NFR BLD AUTO: 67.3 % (ref 40–75)
NITRITE UR QL STRIP: NEGATIVE
OSMOLALITY SERPL CALC.SUM OF ELEC: 272.7 MOSM/KG (ref 273–305)
PH UR STRIP.AUTO: <=5 [PH] (ref 5–8)
PLATELET # BLD AUTO: 222 10*3/MM3 (ref 130–400)
PMV BLD AUTO: 10.9 FL (ref 6–10)
POTASSIUM BLD-SCNC: 5.4 MMOL/L (ref 3.5–5.3)
PROT SERPL-MCNC: 6.8 G/DL (ref 6–8)
PROT UR QL STRIP: NEGATIVE
RBC # BLD AUTO: 3.59 10*6/MM3 (ref 4.2–5.4)
RBC # UR: ABNORMAL /HPF
REF LAB TEST METHOD: ABNORMAL
SODIUM BLD-SCNC: 134 MMOL/L (ref 135–153)
SP GR UR STRIP: 1.02 (ref 1–1.03)
SQUAMOUS #/AREA URNS HPF: ABNORMAL /HPF
UROBILINOGEN UR QL STRIP: ABNORMAL
WBC NRBC COR # BLD: 10.5 10*3/MM3 (ref 4.5–12.5)
WBC UR QL AUTO: ABNORMAL /HPF

## 2018-06-22 PROCEDURE — 80053 COMPREHEN METABOLIC PANEL: CPT | Performed by: EMERGENCY MEDICINE

## 2018-06-22 PROCEDURE — 94799 UNLISTED PULMONARY SVC/PX: CPT

## 2018-06-22 PROCEDURE — 96361 HYDRATE IV INFUSION ADD-ON: CPT

## 2018-06-22 PROCEDURE — 25010000002 ONDANSETRON PER 1 MG: Performed by: EMERGENCY MEDICINE

## 2018-06-22 PROCEDURE — 85025 COMPLETE CBC W/AUTO DIFF WBC: CPT | Performed by: EMERGENCY MEDICINE

## 2018-06-22 PROCEDURE — 99285 EMERGENCY DEPT VISIT HI MDM: CPT

## 2018-06-22 PROCEDURE — 25010000002 IOPAMIDOL 61 % SOLUTION: Performed by: EMERGENCY MEDICINE

## 2018-06-22 PROCEDURE — 71046 X-RAY EXAM CHEST 2 VIEWS: CPT

## 2018-06-22 PROCEDURE — 82150 ASSAY OF AMYLASE: CPT | Performed by: EMERGENCY MEDICINE

## 2018-06-22 PROCEDURE — 81001 URINALYSIS AUTO W/SCOPE: CPT | Performed by: EMERGENCY MEDICINE

## 2018-06-22 PROCEDURE — 74177 CT ABD & PELVIS W/CONTRAST: CPT

## 2018-06-22 PROCEDURE — 71046 X-RAY EXAM CHEST 2 VIEWS: CPT | Performed by: RADIOLOGY

## 2018-06-22 PROCEDURE — 96374 THER/PROPH/DIAG INJ IV PUSH: CPT

## 2018-06-22 PROCEDURE — 74177 CT ABD & PELVIS W/CONTRAST: CPT | Performed by: RADIOLOGY

## 2018-06-22 PROCEDURE — 83690 ASSAY OF LIPASE: CPT | Performed by: EMERGENCY MEDICINE

## 2018-06-22 PROCEDURE — 94640 AIRWAY INHALATION TREATMENT: CPT

## 2018-06-22 RX ORDER — SODIUM CHLORIDE 0.9 % (FLUSH) 0.9 %
10 SYRINGE (ML) INJECTION AS NEEDED
Status: DISCONTINUED | OUTPATIENT
Start: 2018-06-22 | End: 2018-06-22 | Stop reason: HOSPADM

## 2018-06-22 RX ORDER — AZITHROMYCIN 250 MG/1
TABLET, FILM COATED ORAL
Qty: 6 TABLET | Refills: 0 | Status: SHIPPED | OUTPATIENT
Start: 2018-06-22 | End: 2018-08-28

## 2018-06-22 RX ORDER — ONDANSETRON 2 MG/ML
4 INJECTION INTRAMUSCULAR; INTRAVENOUS ONCE
Status: COMPLETED | OUTPATIENT
Start: 2018-06-22 | End: 2018-06-22

## 2018-06-22 RX ORDER — DICYCLOMINE HCL 20 MG
20 TABLET ORAL EVERY 6 HOURS PRN
Qty: 20 TABLET | Refills: 0 | Status: SHIPPED | OUTPATIENT
Start: 2018-06-22 | End: 2018-11-06 | Stop reason: ALTCHOICE

## 2018-06-22 RX ORDER — ONDANSETRON 4 MG/1
4 TABLET, ORALLY DISINTEGRATING ORAL 4 TIMES DAILY
Qty: 15 TABLET | Refills: 0 | Status: SHIPPED | OUTPATIENT
Start: 2018-06-22 | End: 2018-11-06 | Stop reason: ALTCHOICE

## 2018-06-22 RX ORDER — FLUTICASONE PROPIONATE 50 MCG
2 SPRAY, SUSPENSION (ML) NASAL DAILY
Qty: 16 G | Refills: 0 | Status: SHIPPED | OUTPATIENT
Start: 2018-06-22 | End: 2018-11-06 | Stop reason: ALTCHOICE

## 2018-06-22 RX ORDER — ALBUTEROL SULFATE 90 UG/1
2-4 AEROSOL, METERED RESPIRATORY (INHALATION) EVERY 4 HOURS PRN
Qty: 1 INHALER | Refills: 0 | Status: SHIPPED | OUTPATIENT
Start: 2018-06-22 | End: 2022-07-22

## 2018-06-22 RX ORDER — PREDNISONE 20 MG/1
20 TABLET ORAL 3 TIMES DAILY
Qty: 15 TABLET | Refills: 0 | Status: SHIPPED | OUTPATIENT
Start: 2018-06-22 | End: 2018-06-27

## 2018-06-22 RX ORDER — IPRATROPIUM BROMIDE AND ALBUTEROL SULFATE 2.5; .5 MG/3ML; MG/3ML
3 SOLUTION RESPIRATORY (INHALATION) ONCE
Status: COMPLETED | OUTPATIENT
Start: 2018-06-22 | End: 2018-06-22

## 2018-06-22 RX ORDER — SODIUM CHLORIDE 9 MG/ML
125 INJECTION, SOLUTION INTRAVENOUS CONTINUOUS
Status: DISCONTINUED | OUTPATIENT
Start: 2018-06-22 | End: 2018-06-22 | Stop reason: HOSPADM

## 2018-06-22 RX ADMIN — IPRATROPIUM BROMIDE AND ALBUTEROL SULFATE 3 ML: .5; 3 SOLUTION RESPIRATORY (INHALATION) at 03:05

## 2018-06-22 RX ADMIN — IOPAMIDOL 95 ML: 612 INJECTION, SOLUTION INTRAVENOUS at 04:09

## 2018-06-22 RX ADMIN — SODIUM CHLORIDE 125 ML/HR: 9 INJECTION, SOLUTION INTRAVENOUS at 03:56

## 2018-06-22 RX ADMIN — ONDANSETRON 4 MG: 2 INJECTION, SOLUTION INTRAMUSCULAR; INTRAVENOUS at 08:25

## 2018-07-12 DIAGNOSIS — F51.05 INSOMNIA DUE TO MENTAL CONDITION: ICD-10-CM

## 2018-07-12 DIAGNOSIS — F41.1 GENERALIZED ANXIETY DISORDER: ICD-10-CM

## 2018-07-12 DIAGNOSIS — F34.1 DYSTHYMIA: ICD-10-CM

## 2018-07-12 RX ORDER — DESVENLAFAXINE SUCCINATE 50 MG/1
TABLET, EXTENDED RELEASE ORAL
Qty: 90 TABLET | Refills: 0 | OUTPATIENT
Start: 2018-07-12

## 2018-07-12 RX ORDER — TRAZODONE HYDROCHLORIDE 100 MG/1
TABLET ORAL
Qty: 135 TABLET | Refills: 0 | OUTPATIENT
Start: 2018-07-12

## 2018-07-12 RX ORDER — LAMOTRIGINE 25 MG/1
TABLET ORAL
Qty: 90 TABLET | Refills: 0 | OUTPATIENT
Start: 2018-07-12

## 2018-07-13 ENCOUNTER — TRANSCRIBE ORDERS (OUTPATIENT)
Dept: ADMINISTRATIVE | Facility: HOSPITAL | Age: 68
End: 2018-07-13

## 2018-07-13 ENCOUNTER — LAB (OUTPATIENT)
Dept: LAB | Facility: HOSPITAL | Age: 68
End: 2018-07-13

## 2018-07-13 ENCOUNTER — HOSPITAL ENCOUNTER (OUTPATIENT)
Dept: CT IMAGING | Facility: HOSPITAL | Age: 68
Discharge: HOME | End: 2018-07-13
Admitting: PEDIATRICS

## 2018-07-13 DIAGNOSIS — R10.84 ABDOMINAL PAIN, GENERALIZED: Primary | ICD-10-CM

## 2018-07-13 DIAGNOSIS — R10.84 ABDOMINAL PAIN, GENERALIZED: ICD-10-CM

## 2018-07-13 DIAGNOSIS — R11.0 NAUSEA: Primary | ICD-10-CM

## 2018-07-13 DIAGNOSIS — R11.0 NAUSEA: ICD-10-CM

## 2018-07-13 DIAGNOSIS — R11.2 NAUSEA AND VOMITING, INTRACTABILITY OF VOMITING NOT SPECIFIED, UNSPECIFIED VOMITING TYPE: ICD-10-CM

## 2018-07-13 LAB
BASOPHILS # BLD AUTO: 0.04 10*3/MM3 (ref 0–0.3)
BASOPHILS NFR BLD AUTO: 0.3 % (ref 0–2)
CRP SERPL-MCNC: 4.08 MG/DL (ref 0–0.99)
DEPRECATED RDW RBC AUTO: 50.2 FL (ref 37–54)
EOSINOPHIL # BLD AUTO: 0.15 10*3/MM3 (ref 0–0.7)
EOSINOPHIL NFR BLD AUTO: 1.3 % (ref 0–7)
ERYTHROCYTE [DISTWIDTH] IN BLOOD BY AUTOMATED COUNT: 16.9 % (ref 11.5–14.5)
HCT VFR BLD AUTO: 35 % (ref 37–47)
HGB BLD-MCNC: 11.2 G/DL (ref 12–16)
IMM GRANULOCYTES # BLD: 0.04 10*3/MM3 (ref 0–0.03)
IMM GRANULOCYTES NFR BLD: 0.3 % (ref 0–0.5)
LYMPHOCYTES # BLD AUTO: 2.16 10*3/MM3 (ref 1–3)
LYMPHOCYTES NFR BLD AUTO: 18.9 % (ref 16–46)
MCH RBC QN AUTO: 26.4 PG (ref 27–33)
MCHC RBC AUTO-ENTMCNC: 32 G/DL (ref 33–37)
MCV RBC AUTO: 82.5 FL (ref 80–94)
MONOCYTES # BLD AUTO: 0.93 10*3/MM3 (ref 0.1–0.9)
MONOCYTES NFR BLD AUTO: 8.1 % (ref 0–12)
NEUTROPHILS # BLD AUTO: 8.13 10*3/MM3 (ref 1.4–6.5)
NEUTROPHILS NFR BLD AUTO: 71.1 % (ref 40–75)
PLATELET # BLD AUTO: 211 10*3/MM3 (ref 130–400)
PMV BLD AUTO: 12.1 FL (ref 6–10)
RBC # BLD AUTO: 4.24 10*6/MM3 (ref 4.2–5.4)
WBC NRBC COR # BLD: 11.45 10*3/MM3 (ref 4.5–12.5)

## 2018-07-13 PROCEDURE — 25010000002 IOPAMIDOL 61 % SOLUTION: Performed by: RADIOLOGY

## 2018-07-13 PROCEDURE — 86140 C-REACTIVE PROTEIN: CPT

## 2018-07-13 PROCEDURE — 74177 CT ABD & PELVIS W/CONTRAST: CPT

## 2018-07-13 PROCEDURE — 36415 COLL VENOUS BLD VENIPUNCTURE: CPT

## 2018-07-13 PROCEDURE — 85025 COMPLETE CBC W/AUTO DIFF WBC: CPT

## 2018-07-13 PROCEDURE — 74177 CT ABD & PELVIS W/CONTRAST: CPT | Performed by: RADIOLOGY

## 2018-07-13 RX ADMIN — IOPAMIDOL 100 ML: 612 INJECTION, SOLUTION INTRAVENOUS at 14:30

## 2018-07-19 ENCOUNTER — APPOINTMENT (OUTPATIENT)
Dept: CT IMAGING | Facility: HOSPITAL | Age: 68
End: 2018-07-19

## 2018-07-24 DIAGNOSIS — F34.1 DYSTHYMIA: ICD-10-CM

## 2018-07-24 RX ORDER — LAMOTRIGINE 25 MG/1
TABLET ORAL
Qty: 90 TABLET | Refills: 0 | Status: SHIPPED | OUTPATIENT
Start: 2018-07-24 | End: 2018-08-28

## 2018-07-24 NOTE — TELEPHONE ENCOUNTER
Pt hasn't been seen in 2 months. I will refill meds but she needs to have an appointment schedule to see me within the next 8 weeks. Thanks.

## 2018-08-28 ENCOUNTER — OFFICE VISIT (OUTPATIENT)
Dept: PSYCHIATRY | Facility: CLINIC | Age: 68
End: 2018-08-28

## 2018-08-28 VITALS — WEIGHT: 197 LBS | BODY MASS INDEX: 30.92 KG/M2 | HEIGHT: 67 IN

## 2018-08-28 DIAGNOSIS — F34.1 DYSTHYMIA: ICD-10-CM

## 2018-08-28 DIAGNOSIS — F41.1 GENERALIZED ANXIETY DISORDER: ICD-10-CM

## 2018-08-28 PROCEDURE — 99214 OFFICE O/P EST MOD 30 MIN: CPT | Performed by: NURSE PRACTITIONER

## 2018-08-28 RX ORDER — LINACLOTIDE 145 UG/1
CAPSULE, GELATIN COATED ORAL
COMMUNITY
Start: 2018-08-27 | End: 2018-11-06 | Stop reason: ALTCHOICE

## 2018-08-28 RX ORDER — TRAZODONE HYDROCHLORIDE 50 MG/1
TABLET ORAL
COMMUNITY
Start: 2018-07-24 | End: 2018-08-28 | Stop reason: SDUPTHER

## 2018-08-28 RX ORDER — TRAZODONE HYDROCHLORIDE 100 MG/1
100 TABLET ORAL NIGHTLY
Qty: 90 TABLET | Refills: 0 | Status: SHIPPED | OUTPATIENT
Start: 2018-08-28 | End: 2018-11-06 | Stop reason: SDUPTHER

## 2018-08-28 RX ORDER — PANTOPRAZOLE SODIUM 40 MG/1
TABLET, DELAYED RELEASE ORAL
COMMUNITY
Start: 2018-08-12 | End: 2022-07-22

## 2018-08-28 RX ORDER — DESVENLAFAXINE 100 MG/1
100 TABLET, EXTENDED RELEASE ORAL EVERY MORNING
Qty: 90 TABLET | Refills: 0 | Status: SHIPPED | OUTPATIENT
Start: 2018-08-28 | End: 2018-11-06 | Stop reason: SDUPTHER

## 2018-08-28 NOTE — PROGRESS NOTES
Subjective   Charito Freeman is a 67 y.o. female who is here today for medication management follow up.    Chief Complaint:I'm alright     HPI:  History of Present Illness   She reports she has been sick all summer not feeling well. GI issues including constipation, blockages. She is having ongoing treatment with Dr. Jurado with a colonoscopy scheduled for next week. She had a steroid shot in her back which has helped alleviate some chronic back pain. She was able to see her grandkids a couple of times over the summer. She reports sleeping without issue. Her appetite has been poor, difficulty due to GI issues. She has lost about 25lbs over the summer. She reports BG stays around 200, sharing she went to PCP last week. Her  is being supportive. She has been having increased passive SI due to medical concerns. This has caused some anxiety/significant worry. Denies any current SI/HI/AH/VH. She has an appointment with Pulmonologist on Sept 27 due to productive cough that has been worsening.     The following portions of the patient's history were reviewed and updated as appropriate: allergies, current medications, past family history, past medical history, past social history, past surgical history and problem list.    Review of Systems   Constitutional: Positive for fatigue. Negative for activity change and appetite change.   HENT: Negative.  Negative for congestion and sinus pressure.    Eyes: Negative for visual disturbance.   Respiratory: Positive for cough and shortness of breath.    Cardiovascular: Negative.    Gastrointestinal: Positive for abdominal distention and abdominal pain. Negative for constipation and nausea.   Endocrine: Negative.    Genitourinary: Negative.    Musculoskeletal: Positive for arthralgias, back pain and myalgias.   Skin: Negative.    Allergic/Immunologic: Negative.    Neurological: Positive for numbness. Negative for dizziness, seizures and headaches.        In left arm r/t bulging  "disc    Hematological: Negative.    Psychiatric/Behavioral: Positive for agitation, decreased concentration and dysphoric mood. Negative for behavioral problems, confusion, hallucinations, self-injury, sleep disturbance and suicidal ideas. The patient is nervous/anxious. The patient is not hyperactive.      Patient denies any recent medical illnesses.  No acute cardiopulmonary symptoms evident.  No acute gastrointestinal complaints.  Patient's appetite and intake are adequate.  Patient's current weight compared with last weight.    Objective   Physical Exam   Constitutional: She is oriented to person, place, and time. She appears well-developed and well-nourished. No distress.   Neurological: She is alert and oriented to person, place, and time.   Skin: She is not diaphoretic.   Nursing note and vitals reviewed.    Height 170.2 cm (67\"), weight 89.4 kg (197 lb).    Allergies   Allergen Reactions   • Citalopram    • Dasatinib    • Imatinib    • Lisinopril        Current Medications:   Current Outpatient Prescriptions   Medication Sig Dispense Refill   • albuterol (PROVENTIL HFA;VENTOLIN HFA) 108 (90 Base) MCG/ACT inhaler Inhale 2-4 puffs Every 4 (Four) Hours As Needed for Wheezing or Shortness of Air (Cough). 1 inhaler 0   • atorvastatin (LIPITOR) 10 MG tablet      • BOSULIF 100 MG chemo tablet      • diclofenac (VOLTAREN) 1 % gel gel      • dicyclomine (BENTYL) 20 MG tablet Take 1 tablet by mouth Every 6 (Six) Hours As Needed (Abdominal pain). 20 tablet 0   • fluticasone (FLONASE) 50 MCG/ACT nasal spray 2 sprays into each nostril Daily. 16 g 0   • gabapentin (NEURONTIN) 100 MG capsule      • HUMALOG KWIKPEN 100 UNIT/ML solution pen-injector      • insulin glargine (LANTUS) 100 UNIT/ML injection Inject  under the skin Daily.     • LANTUS SOLOSTAR 100 UNIT/ML injection pen      • levothyroxine (SYNTHROID, LEVOTHROID) 25 MCG tablet Daily.     • magnesium hydroxide (MILK OF MAGNESIA) 400 MG/5ML suspension Take 15 mL by " mouth Daily. 480 mL 0   • metFORMIN (GLUCOPHAGE) 1000 MG tablet 2 (Two) Times a Day.     • metoprolol tartrate (LOPRESSOR) 25 MG tablet Take 25 mg by mouth 2 (two) times a day.     • ondansetron ODT (ZOFRAN-ODT) 4 MG disintegrating tablet Take 1 tablet by mouth 4 (Four) Times a Day. 15 tablet 0   • oxybutynin (DITROPAN) 5 MG tablet Take 5 mg by mouth daily.     • pantoprazole (PROTONIX) 20 MG EC tablet Daily.     • polyethylene glycol (MIRALAX) packet Take 17 g by mouth Daily. 90 packet 0   • raNITIdine (ZANTAC) 300 MG tablet      • TRULICITY 0.75 MG/0.5ML solution pen-injector      • desvenlafaxine (PRISTIQ) 100 MG 24 hr tablet Take 1 tablet by mouth Every Morning. 90 tablet 0   • LINZESS 145 MCG capsule capsule      • LYRICA 50 MG capsule      • pantoprazole (PROTONIX) 40 MG EC tablet      • traZODone (DESYREL) 100 MG tablet Take 1 tablet by mouth Every Night. 90 tablet 0     No current facility-administered medications for this visit.        Mental Status Exam:   Hygiene:   fair  Cooperation:  Cooperative  Eye Contact:  Good  Psychomotor Behavior:  Appropriate  Affect:  Full range  Hopelessness: 2  Speech:  Normal  Thought Process:  Linear  Thought Content:  Normal  Suicidal:  None  Homicidal:  None  Hallucinations:  None  Delusion:  None  Memory:  Intact  Orientation:  Person, Place, Time and Situation  Reliability:  fair  Insight:  Fair  Judgement:  Fair  Impulse Control:  Fair  Physical/Medical Issues:  Yes luekemia, dm, GI issues    Assessment/Plan   Diagnoses and all orders for this visit:    Dysthymia  -     desvenlafaxine (PRISTIQ) 100 MG 24 hr tablet; Take 1 tablet by mouth Every Morning.  -     traZODone (DESYREL) 100 MG tablet; Take 1 tablet by mouth Every Night.    Generalized anxiety disorder  -     desvenlafaxine (PRISTIQ) 100 MG 24 hr tablet; Take 1 tablet by mouth Every Morning.  -     traZODone (DESYREL) 100 MG tablet; Take 1 tablet by mouth Every Night.      Impression: She is having exacerbation  of anxiety/depression due to current medical concerns and being ill. We will increase Pristiq to 100mg daily for mood, pain. Continue Trazodone for sleep and lamictal for mood.  Patient was instructed on medication side effects, benefits, and also of no treatment. BMI 30.8 reviewed with patient. Diet was discussed especially healthy diet choices and increasing activity and exercise.  Patient was strongly urged to continue weight maintance or weight loss efforts.  Patient reported verbalized understanding of these instructions  She again refuses to reschedule psychotherapy with Pia or any other provider at this time.   We discussed risks, benefits, and side effects of the above medication and the patient was agreeable with the plan.  Pt's show significant impairment in multiple important areas of functioning. Her prognosis is fair and patient needs to consider importance of continued treatment and medication compliance.   Return in about 4 weeks (around 9/25/2018), or if symptoms worsen or fail to improve, for Recheck, Next scheduled follow up.  The patient was instructed to call clinic as needed or go to ER if in crisis.

## 2018-10-21 DIAGNOSIS — F34.1 DYSTHYMIA: ICD-10-CM

## 2018-10-22 RX ORDER — LAMOTRIGINE 25 MG/1
TABLET ORAL
Qty: 90 TABLET | Refills: 0 | OUTPATIENT
Start: 2018-10-22

## 2018-11-06 ENCOUNTER — OFFICE VISIT (OUTPATIENT)
Dept: PSYCHIATRY | Facility: CLINIC | Age: 68
End: 2018-11-06

## 2018-11-06 VITALS
SYSTOLIC BLOOD PRESSURE: 133 MMHG | HEIGHT: 67 IN | HEART RATE: 90 BPM | BODY MASS INDEX: 30.61 KG/M2 | DIASTOLIC BLOOD PRESSURE: 82 MMHG | WEIGHT: 195 LBS

## 2018-11-06 DIAGNOSIS — F41.1 GENERALIZED ANXIETY DISORDER: ICD-10-CM

## 2018-11-06 DIAGNOSIS — F34.1 DYSTHYMIA: Primary | ICD-10-CM

## 2018-11-06 DIAGNOSIS — Z79.899 MEDICATION MANAGEMENT: ICD-10-CM

## 2018-11-06 PROCEDURE — 99214 OFFICE O/P EST MOD 30 MIN: CPT | Performed by: NURSE PRACTITIONER

## 2018-11-06 RX ORDER — DESVENLAFAXINE 100 MG/1
100 TABLET, EXTENDED RELEASE ORAL EVERY MORNING
Qty: 90 TABLET | Refills: 0 | Status: SHIPPED | OUTPATIENT
Start: 2018-11-06 | End: 2018-11-29 | Stop reason: SDUPTHER

## 2018-11-06 RX ORDER — LAMOTRIGINE 25 MG/1
TABLET ORAL
Qty: 42 TABLET | Refills: 0 | Status: SHIPPED | OUTPATIENT
Start: 2018-11-06 | End: 2018-12-03 | Stop reason: SDUPTHER

## 2018-11-06 RX ORDER — TRAZODONE HYDROCHLORIDE 100 MG/1
100 TABLET ORAL NIGHTLY
Qty: 90 TABLET | Refills: 0 | Status: SHIPPED | OUTPATIENT
Start: 2018-11-06 | End: 2019-02-26

## 2018-11-06 RX ORDER — LAMOTRIGINE 25 MG/1
25 TABLET ORAL DAILY
Qty: 42 TABLET | Refills: 0 | Status: SHIPPED | OUTPATIENT
Start: 2018-11-06 | End: 2018-11-06 | Stop reason: SDUPTHER

## 2018-11-06 NOTE — PROGRESS NOTES
"Subjective   Charito Freeman is a 68 y.o. female who is here today for medication management follow up.    Chief Complaint:Anxiety/Depression     HPI:  History of Present Illness   Pt reports her visit to the pulmonologist and had a colonoscopy which were both fairly normal. She is having more regular BM's taking laxative. She reports her GI issues have improved \"it bothered me some last night, more than usual, but I had a BM this morning and it is feeling better.\" Her back pain is currently tolerable after steroid injections. She had an Echo done and has f/u for results this Friday 11/9/18. She continues to have a cough that is productive, telling her she had some fluid around her heart and lungs. She reports feelings of fatigue, lack of motivation. She reports she sleep well without issue. She reports her mood has remained stable/notable improvements. Reports depression \"about the same\" and anxiety \"not been quite as bad.\" She recalls her mother passing away from heart failure and this worries pt. She denies any SI/HI/AH/VH. Appetite is fair, tolerating diet.   No other concerns/stressors at this time. She doesn't check her BG regularly and states she doesn't much watch what she eats. She reports getting out of the house \"a little bit.\"    The following portions of the patient's history were reviewed and updated as appropriate: allergies, current medications, past family history, past medical history, past social history, past surgical history and problem list.    Review of Systems   Constitutional: Positive for fatigue. Negative for activity change and appetite change.   HENT: Negative.  Negative for congestion and sinus pressure.    Eyes: Negative for visual disturbance.   Respiratory: Positive for cough and shortness of breath.         Feelings of \"smothering\"   Cardiovascular: Negative.    Gastrointestinal: Positive for abdominal distention and abdominal pain. Negative for constipation and nausea.   Endocrine: " "Negative.    Genitourinary: Negative.    Musculoskeletal: Positive for arthralgias, back pain and myalgias.   Skin: Negative.    Allergic/Immunologic: Negative.    Neurological: Negative for dizziness, seizures, numbness and headaches.   Hematological: Negative.    Psychiatric/Behavioral: Positive for dysphoric mood. Negative for agitation, behavioral problems, confusion, decreased concentration, hallucinations, self-injury, sleep disturbance and suicidal ideas. The patient is not nervous/anxious and is not hyperactive.        Objective   Physical Exam   Constitutional: She is oriented to person, place, and time. She appears well-developed and well-nourished. No distress.   Neurological: She is alert and oriented to person, place, and time.   Skin: She is not diaphoretic.   Nursing note and vitals reviewed.    Blood pressure 133/82, pulse 90, height 170.2 cm (67.01\"), weight 88.5 kg (195 lb).    Allergies   Allergen Reactions   • Citalopram    • Dasatinib    • Imatinib    • Lisinopril        Current Medications:   Current Outpatient Prescriptions   Medication Sig Dispense Refill   • albuterol (PROVENTIL HFA;VENTOLIN HFA) 108 (90 Base) MCG/ACT inhaler Inhale 2-4 puffs Every 4 (Four) Hours As Needed for Wheezing or Shortness of Air (Cough). 1 inhaler 0   • atorvastatin (LIPITOR) 10 MG tablet      • BOSULIF 100 MG chemo tablet      • desvenlafaxine (PRISTIQ) 100 MG 24 hr tablet Take 1 tablet by mouth Every Morning. 90 tablet 0   • diclofenac (VOLTAREN) 1 % gel gel      • HUMALOG KWIKPEN 100 UNIT/ML solution pen-injector      • insulin glargine (LANTUS) 100 UNIT/ML injection Inject  under the skin Daily.     • LANTUS SOLOSTAR 100 UNIT/ML injection pen      • levothyroxine (SYNTHROID, LEVOTHROID) 25 MCG tablet Daily.     • LYRICA 50 MG capsule      • metFORMIN (GLUCOPHAGE) 1000 MG tablet 2 (Two) Times a Day.     • metoprolol tartrate (LOPRESSOR) 25 MG tablet Take 25 mg by mouth 2 (two) times a day.     • oxybutynin " (DITROPAN) 5 MG tablet Take 5 mg by mouth daily.     • pantoprazole (PROTONIX) 40 MG EC tablet      • polyethylene glycol (MIRALAX) packet Take 17 g by mouth Daily. 90 packet 0   • raNITIdine (ZANTAC) 300 MG tablet      • traZODone (DESYREL) 100 MG tablet Take 1 tablet by mouth Every Night. 90 tablet 0   • TRULICITY 0.75 MG/0.5ML solution pen-injector      • lamoTRIgine (LaMICtal) 25 MG tablet Take 1 tab PO QAM for 14 days then increase to 2 tabs QAM 42 tablet 0     No current facility-administered medications for this visit.        Mental Status Exam:   Hygiene:   fair  Cooperation:  Cooperative  Eye Contact:  Good  Psychomotor Behavior:  Appropriate  Affect:  Full range  Hopelessness: 2  Speech:  Normal  Thought Process:  Linear  Thought Content:  Normal  Suicidal:  None  Homicidal:  None  Hallucinations:  None  Delusion:  None  Memory:  Intact  Orientation:  Person, Place, Time and Situation  Reliability:  fair  Insight:  Fair  Judgement:  Fair  Impulse Control:  Fair  Physical/Medical Issues:  Yes luekemia in remission, dm, GI issues    Assessment/Plan   Diagnoses and all orders for this visit:    Dysthymia  -     desvenlafaxine (PRISTIQ) 100 MG 24 hr tablet; Take 1 tablet by mouth Every Morning.  -     traZODone (DESYREL) 100 MG tablet; Take 1 tablet by mouth Every Night.  -     lamoTRIgine (LaMICtal) 25 MG tablet; Take 1 tab PO QAM for 14 days then increase to 2 tabs QAM    Generalized anxiety disorder  -     desvenlafaxine (PRISTIQ) 100 MG 24 hr tablet; Take 1 tablet by mouth Every Morning.  -     traZODone (DESYREL) 100 MG tablet; Take 1 tablet by mouth Every Night.    Medication management    Other orders  -     Discontinue: lamoTRIgine (LaMICtal) 25 MG tablet; Take 1 tablet by mouth Daily.      Impression: She is having some ongoing anxiety/depression due to current medical concerns and being ill. Continue Pristiq to 100mg daily for mood, pain. Continue Trazodone for sleep and restart lamictal for mood.   Patient was instructed on medication side effects, benefits, and also of no treatment. Body mass index is 30.53 kg/m².  Patient was educated on healthier and more balanced diet choices and encouraged exercise within physical limitations.   Patient was strongly urged to continue weight maintance or weight loss efforts.  Patient reported verbalized understanding of these instructions  She again refuses to reschedule psychotherapy with Pia or any other provider at this time after encouraged.   We discussed risks, benefits, and side effects of the above medication and the patient was agreeable with the plan.  Pt's show significant impairment in multiple important areas of functioning. Her prognosis is fair and patient needs to consider importance of continued treatment and medication compliance.   Return in about 3 months (around 2/6/2019), or if symptoms worsen or fail to improve, for Recheck, Next scheduled follow up.  The patient was instructed to call clinic as needed or go to ER if in crisis.

## 2018-11-23 DIAGNOSIS — F41.1 GENERALIZED ANXIETY DISORDER: ICD-10-CM

## 2018-11-23 DIAGNOSIS — F34.1 DYSTHYMIA: ICD-10-CM

## 2018-11-26 RX ORDER — TRAZODONE HYDROCHLORIDE 100 MG/1
TABLET ORAL
Qty: 90 TABLET | Refills: 0 | Status: SHIPPED | OUTPATIENT
Start: 2018-11-26 | End: 2019-02-26

## 2018-11-29 DIAGNOSIS — F34.1 DYSTHYMIA: ICD-10-CM

## 2018-11-29 DIAGNOSIS — F41.1 GENERALIZED ANXIETY DISORDER: ICD-10-CM

## 2018-11-29 RX ORDER — DESVENLAFAXINE 100 MG/1
TABLET, EXTENDED RELEASE ORAL
Qty: 90 TABLET | Refills: 0 | OUTPATIENT
Start: 2018-11-29

## 2018-11-29 RX ORDER — DESVENLAFAXINE 100 MG/1
100 TABLET, EXTENDED RELEASE ORAL EVERY MORNING
Qty: 90 TABLET | Refills: 0 | Status: SHIPPED | OUTPATIENT
Start: 2018-11-29 | End: 2019-03-28 | Stop reason: SDUPTHER

## 2018-12-02 DIAGNOSIS — F34.1 DYSTHYMIA: ICD-10-CM

## 2018-12-03 RX ORDER — LAMOTRIGINE 25 MG/1
TABLET ORAL
Qty: 42 TABLET | Refills: 0 | OUTPATIENT
Start: 2018-12-03

## 2018-12-03 RX ORDER — LAMOTRIGINE 100 MG/1
50 TABLET ORAL DAILY
Qty: 45 TABLET | Refills: 0 | Status: SHIPPED | OUTPATIENT
Start: 2018-12-03 | End: 2019-02-26

## 2019-01-17 ENCOUNTER — TELEPHONE (OUTPATIENT)
Dept: PSYCHIATRY | Facility: CLINIC | Age: 69
End: 2019-01-17

## 2019-01-17 RX ORDER — LORAZEPAM 0.5 MG/1
0.5 TABLET ORAL 2 TIMES DAILY PRN
Qty: 15 TABLET | Refills: 0 | Status: SHIPPED | OUTPATIENT
Start: 2019-01-17 | End: 2019-07-11

## 2019-01-17 NOTE — TELEPHONE ENCOUNTER
Spoke with pt. Significant distress/anxiety due to issue with affordability of her chemo medication. Discussed pt assistance program and she was to receive paperowrk in mail but wrong paperwork sent. I have printed off this document and placing in the mail for her today. I also sent in prn Ativan #15 for the anxiety to be taken on emergent basis. RBSE of meds discussed. Aware not to take when she takes Lyrica. Aware of risk of dependence with benzo medications.

## 2019-02-26 ENCOUNTER — OFFICE VISIT (OUTPATIENT)
Dept: PSYCHIATRY | Facility: CLINIC | Age: 69
End: 2019-02-26

## 2019-02-26 VITALS
SYSTOLIC BLOOD PRESSURE: 133 MMHG | HEIGHT: 67 IN | DIASTOLIC BLOOD PRESSURE: 60 MMHG | WEIGHT: 200.8 LBS | BODY MASS INDEX: 31.52 KG/M2 | HEART RATE: 83 BPM

## 2019-02-26 DIAGNOSIS — Z79.899 MEDICATION MANAGEMENT: ICD-10-CM

## 2019-02-26 DIAGNOSIS — F34.1 DYSTHYMIA: Primary | ICD-10-CM

## 2019-02-26 DIAGNOSIS — F41.1 GENERALIZED ANXIETY DISORDER: ICD-10-CM

## 2019-02-26 DIAGNOSIS — F51.05 INSOMNIA DUE TO MENTAL CONDITION: ICD-10-CM

## 2019-02-26 PROCEDURE — 90792 PSYCH DIAG EVAL W/MED SRVCS: CPT | Performed by: NURSE PRACTITIONER

## 2019-02-26 RX ORDER — AMITRIPTYLINE HYDROCHLORIDE 25 MG/1
25 TABLET, FILM COATED ORAL NIGHTLY
Qty: 30 TABLET | Refills: 1 | Status: SHIPPED | OUTPATIENT
Start: 2019-02-26 | End: 2019-03-28 | Stop reason: SDUPTHER

## 2019-02-26 RX ORDER — POTASSIUM CHLORIDE 750 MG/1
TABLET, EXTENDED RELEASE ORAL
COMMUNITY
Start: 2018-12-09 | End: 2022-07-22

## 2019-02-26 RX ORDER — FUROSEMIDE 20 MG/1
TABLET ORAL
COMMUNITY
Start: 2018-12-08 | End: 2022-07-22

## 2019-02-26 NOTE — PROGRESS NOTES
"Subjective   Charito Freeman is a 68 y.o. female who is here today for medication management follow up.    Chief Complaint:Anxiety/Depression     HPI:  History of Present Illness   Pt reports she is having difficulty sleeping. It is variable and she apparently has her days and nights mixed up. She is staying up until 6am and sleeping during the day. She requests she may need something else to help her with sleep. She rates anxiety and depression 7/10 with 10 worst on 0-10 scale. No SI/HI/AH/VH. She feels HHW, irritable, agitated \"can't do nothing\" and anhedonia. She has difficulty concentrating, will start a task and forget what she was doing and get sidetracked. She reports overall compliance with her meds but will forget to take her meds at least one day a week. Her appetite is good, tolerates diet. She has to go to [employer] today for groceries. She has been trying to get out of her house when it is nice weather. She had lab drawn a couple weeks ago at PCP and has been taking a multivitamin.  Patient reports that when she does sleep she will sleep for 1 hour at a time.  Her symptoms are worsening including irritability and agitation. She is having financial difficulty affording her required medications but is to have supplemental being in March. She reports her appetite is fair, tolerates diet. No OCD, sarmad, delusions. She feels tired and fatigued most days, lack of energy.      The following portions of the patient's history were reviewed and updated as appropriate: allergies, current medications, past family history, past medical history, past social history, past surgical history and problem list.    Review of Systems   Constitutional: Positive for fatigue. Negative for activity change and appetite change.   HENT: Negative.  Negative for congestion and sinus pressure.    Eyes: Negative for visual disturbance.   Respiratory: Positive for cough and shortness of breath.         Feelings of \"smothering\" " "  Cardiovascular: Negative.    Gastrointestinal: Positive for abdominal distention and abdominal pain. Negative for constipation and nausea.   Endocrine: Negative.    Genitourinary: Negative.    Musculoskeletal: Positive for arthralgias, back pain and myalgias.   Skin: Negative.    Allergic/Immunologic: Negative.    Neurological: Negative for dizziness, seizures, numbness and headaches.   Hematological: Negative.    Psychiatric/Behavioral: Positive for dysphoric mood. Negative for agitation, behavioral problems, confusion, decreased concentration, hallucinations, self-injury, sleep disturbance and suicidal ideas. The patient is not nervous/anxious and is not hyperactive.        Objective   Physical Exam   Constitutional: She is oriented to person, place, and time. She appears well-developed and well-nourished. No distress.   Neurological: She is alert and oriented to person, place, and time.   Skin: She is not diaphoretic.   Nursing note and vitals reviewed.    Blood pressure 133/60, pulse 83, height 170.2 cm (67.01\"), weight 91.1 kg (200 lb 12.8 oz).    Allergies   Allergen Reactions   • Citalopram    • Dasatinib    • Imatinib    • Lisinopril        Current Medications:   Current Outpatient Medications   Medication Sig Dispense Refill   • albuterol (PROVENTIL HFA;VENTOLIN HFA) 108 (90 Base) MCG/ACT inhaler Inhale 2-4 puffs Every 4 (Four) Hours As Needed for Wheezing or Shortness of Air (Cough). 1 inhaler 0   • atorvastatin (LIPITOR) 10 MG tablet      • BOSULIF 100 MG chemo tablet      • desvenlafaxine (PRISTIQ) 100 MG 24 hr tablet Take 1 tablet by mouth Every Morning. 90 tablet 0   • diclofenac (VOLTAREN) 1 % gel gel      • furosemide (LASIX) 20 MG tablet      • HUMALOG KWIKPEN 100 UNIT/ML solution pen-injector      • insulin glargine (LANTUS) 100 UNIT/ML injection Inject  under the skin Daily.     • LANTUS SOLOSTAR 100 UNIT/ML injection pen      • levothyroxine (SYNTHROID, LEVOTHROID) 25 MCG tablet Daily.     • " "LORazepam (ATIVAN) 0.5 MG tablet Take 1 tablet by mouth 2 (Two) Times a Day As Needed for Anxiety. 15 tablet 0   • metFORMIN (GLUCOPHAGE) 1000 MG tablet 2 (Two) Times a Day.     • metoprolol tartrate (LOPRESSOR) 25 MG tablet Take 25 mg by mouth 2 (two) times a day.     • oxybutynin (DITROPAN) 5 MG tablet Take 5 mg by mouth daily.     • pantoprazole (PROTONIX) 40 MG EC tablet      • polyethylene glycol (MIRALAX) packet Take 17 g by mouth Daily. 90 packet 0   • potassium chloride (K-DUR,KLOR-CON) 10 MEQ CR tablet      • raNITIdine (ZANTAC) 300 MG tablet      • amitriptyline (ELAVIL) 25 MG tablet Take 1 tablet by mouth Every Night. 30 tablet 1   • LYRICA 50 MG capsule      • TRULICITY 0.75 MG/0.5ML solution pen-injector        No current facility-administered medications for this visit.        Mental Status Exam:   Hygiene:   fair  Cooperation:  Cooperative  Eye Contact:  Good  Psychomotor Behavior:  Appropriate  Affect:  Full range  Hopelessness: 2  Speech:  Normal  Thought Process:  Linear  Thought Content:  Normal  Suicidal:  None  Homicidal:  None  Hallucinations:  None  Delusion:  None  Memory:  Intact  Orientation:  Person, Place, Time and Situation  Reliability:  fair  Insight:  Fair  Judgement:  Fair  Impulse Control:  Fair  Physical/Medical Issues:  Yes leukemia in remission, dm, GI issues    Assessment/Plan   Diagnoses and all orders for this visit:    Dysthymia    Generalized anxiety disorder    Medication management    Insomnia due to mental condition  -     amitriptyline (ELAVIL) 25 MG tablet; Take 1 tablet by mouth Every Night.      Impression: She is having exacerbative depressive symptoms, irritability. Continue Pristiq to 100mg daily for mood, pain. DC trazodone and try Elavil for sleep difficulty. High encouraged pt to get sleep study to r/o sleep apnea \"I wouldn't wear that mask if I did have it so why go.\" DC Lamictal as it was intolerable. Pt was instructed on medication side effects, benefits, and " also of no treatment. Body mass index is 31.44 kg/m².  Patient was educated on healthier and more balanced diet choices and encouraged exercise within physical limitations.   Patient was strongly urged to continue weight maintance or weight loss efforts.  Patient reported verbalized understanding of these instructions  She again refuses to reschedule psychotherapy with Pia or any other provider at this time after encouraged.   We discussed risks, benefits, and side effects of the above medication and the patient was agreeable with the plan.  Pt's show significant impairment in multiple important areas of functioning. Her prognosis is fair and patient needs to consider importance of continued treatment and medication compliance.   Return in about 4 weeks (around 3/26/2019), or if symptoms worsen or fail to improve, for Recheck, Next scheduled follow up.  The patient was instructed to call clinic as needed or go to ER if in crisis.

## 2019-03-28 ENCOUNTER — OFFICE VISIT (OUTPATIENT)
Dept: PSYCHIATRY | Facility: CLINIC | Age: 69
End: 2019-03-28

## 2019-03-28 VITALS
BODY MASS INDEX: 30.04 KG/M2 | DIASTOLIC BLOOD PRESSURE: 72 MMHG | HEART RATE: 93 BPM | HEIGHT: 67 IN | SYSTOLIC BLOOD PRESSURE: 108 MMHG | WEIGHT: 191.4 LBS

## 2019-03-28 DIAGNOSIS — F51.05 INSOMNIA DUE TO MENTAL CONDITION: ICD-10-CM

## 2019-03-28 DIAGNOSIS — F34.1 DYSTHYMIA: ICD-10-CM

## 2019-03-28 DIAGNOSIS — F41.1 GENERALIZED ANXIETY DISORDER: ICD-10-CM

## 2019-03-28 PROCEDURE — 90833 PSYTX W PT W E/M 30 MIN: CPT | Performed by: NURSE PRACTITIONER

## 2019-03-28 PROCEDURE — 99214 OFFICE O/P EST MOD 30 MIN: CPT | Performed by: NURSE PRACTITIONER

## 2019-03-28 RX ORDER — AMITRIPTYLINE HYDROCHLORIDE 25 MG/1
25 TABLET, FILM COATED ORAL NIGHTLY
Qty: 30 TABLET | Refills: 1 | Status: SHIPPED | OUTPATIENT
Start: 2019-03-28 | End: 2019-06-08 | Stop reason: SDUPTHER

## 2019-03-28 RX ORDER — DESVENLAFAXINE 100 MG/1
100 TABLET, EXTENDED RELEASE ORAL EVERY MORNING
Qty: 90 TABLET | Refills: 0 | Status: SHIPPED | OUTPATIENT
Start: 2019-03-28 | End: 2019-07-11 | Stop reason: SDUPTHER

## 2019-03-28 NOTE — PROGRESS NOTES
"Subjective   Charito Freeman is a 68 y.o. female who is here today for medication management follow up.    Chief Complaint:Anxiety/Depression     HPI:    Client shared, she has a few family member that she does like.   After attempting to get patient to talk more about family issues.  Client shared, \"I don't want to talk about it.\"  Rates depression 3 and anxiety 2 on 0-10 scale with 0= best and 10=worst. Client feels anxious, fatigue, helpless, hopeless and worthless.  Elavil has improved quality for sleep. Client sleeping 6-7 hours per night. Takes 30-40 minutes to fall to sleep, 2 times per week, awakes 2 times per night to go to the bathroom but falls back to sleep within 5 minutes.   No SI/HI/AH/VH.  Denies substance abuse (illegal drugs, alcohol, marijuana, or tobacco). Reports medication compliances. Denies side effects.  Denies having any new stressors. Drinks caffeinated pop, 1-2, 12 ounce cans each day. Appetite is good.  \"I eat whenever I want too.\"       The following portions of the patient's history were reviewed and updated as appropriate: allergies, current medications, past family history, past medical history, past social history, past surgical history and problem list.    Review of Systems   Constitutional: Positive for fatigue. Negative for activity change and appetite change.   HENT: Negative.  Negative for congestion and sinus pressure.    Eyes: Negative for visual disturbance.   Respiratory: Negative for cough and shortness of breath.         Feelings of \"smothering\"   Cardiovascular: Negative.    Gastrointestinal: Negative for abdominal distention, abdominal pain, constipation and nausea.   Endocrine: Negative.    Genitourinary: Negative.    Musculoskeletal: Positive for arthralgias, back pain and myalgias.   Skin: Negative.    Allergic/Immunologic: Negative.    Neurological: Negative for dizziness, seizures, numbness and headaches.   Hematological: Negative.    Psychiatric/Behavioral: Positive " "for dysphoric mood. Negative for agitation, behavioral problems, confusion, decreased concentration, hallucinations, self-injury, sleep disturbance and suicidal ideas. The patient is not nervous/anxious and is not hyperactive.        Objective   Physical Exam   Constitutional: She is oriented to person, place, and time. She appears well-developed and well-nourished. No distress.   Neurological: She is alert and oriented to person, place, and time.   Skin: She is not diaphoretic.   Nursing note and vitals reviewed.    Blood pressure 108/72, pulse 93, height 170.2 cm (67.01\"), weight 86.8 kg (191 lb 6.4 oz).    Allergies   Allergen Reactions   • Citalopram    • Dasatinib    • Imatinib    • Lisinopril        Current Medications:   Current Outpatient Medications   Medication Sig Dispense Refill   • albuterol (PROVENTIL HFA;VENTOLIN HFA) 108 (90 Base) MCG/ACT inhaler Inhale 2-4 puffs Every 4 (Four) Hours As Needed for Wheezing or Shortness of Air (Cough). 1 inhaler 0   • amitriptyline (ELAVIL) 25 MG tablet Take 1 tablet by mouth Every Night. 30 tablet 1   • atorvastatin (LIPITOR) 10 MG tablet      • BOSULIF 100 MG chemo tablet      • desvenlafaxine (PRISTIQ) 100 MG 24 hr tablet Take 1 tablet by mouth Every Morning. 90 tablet 0   • diclofenac (VOLTAREN) 1 % gel gel      • furosemide (LASIX) 20 MG tablet      • HUMALOG KWIKPEN 100 UNIT/ML solution pen-injector      • insulin glargine (LANTUS) 100 UNIT/ML injection Inject  under the skin Daily.     • LANTUS SOLOSTAR 100 UNIT/ML injection pen      • levothyroxine (SYNTHROID, LEVOTHROID) 25 MCG tablet Daily.     • LORazepam (ATIVAN) 0.5 MG tablet Take 1 tablet by mouth 2 (Two) Times a Day As Needed for Anxiety. 15 tablet 0   • LYRICA 50 MG capsule      • metFORMIN (GLUCOPHAGE) 1000 MG tablet 2 (Two) Times a Day.     • metoprolol tartrate (LOPRESSOR) 25 MG tablet Take 25 mg by mouth 2 (two) times a day.     • oxybutynin (DITROPAN) 5 MG tablet Take 5 mg by mouth daily.     • " "pantoprazole (PROTONIX) 40 MG EC tablet      • polyethylene glycol (MIRALAX) packet Take 17 g by mouth Daily. 90 packet 0   • potassium chloride (K-DUR,KLOR-CON) 10 MEQ CR tablet      • raNITIdine (ZANTAC) 300 MG tablet      • TRULICITY 0.75 MG/0.5ML solution pen-injector        No current facility-administered medications for this visit.        Mental Status Exam:   Hygiene:   fair  Cooperation:  Cooperative  Eye Contact:  Good  Psychomotor Behavior:  Appropriate  Affect:  Full range  Hopelessness: 2  Speech:  Normal  Thought Process:  Linear  Thought Content:  Normal  Suicidal:  None  Homicidal:  None  Hallucinations:  None  Delusion:  None  Memory:  Intact  Orientation:  Person, Place, Time and Situation  Reliability:  fair  Insight:  Fair  Judgement:  Fair  Impulse Control:  Fair  Physical/Medical Issues:  Yes leukemia in remission, dm, GI issues    Assessment/Plan   Diagnoses and all orders for this visit:    Dysthymia  -     desvenlafaxine (PRISTIQ) 100 MG 24 hr tablet; Take 1 tablet by mouth Every Morning.    Generalized anxiety disorder  -     desvenlafaxine (PRISTIQ) 100 MG 24 hr tablet; Take 1 tablet by mouth Every Morning.    Insomnia due to mental condition  -     amitriptyline (ELAVIL) 25 MG tablet; Take 1 tablet by mouth Every Night.      Impression: She is having exacerbative depressive symptoms, irritability. Continue Pristiq to 100mg daily for mood, pain. Continue elavil for sleep difficulty. Highly encouraged pt to get sleep study to r/o sleep apnea \"I wouldn't wear that mask if I did have it so why go.\"  Pt was instructed on medication side effects, benefits, and also of no treatment. Body mass index is 29.97 kg/m².  Patient was educated on healthier and more balanced diet choices and encouraged exercise within physical limitations.   Patient was strongly urged to continue weight maintance or weight loss efforts.  Patient reported verbalized understanding of these instructions  She again refuses " to reschedule psychotherapy with Pia or any other provider at this time after encouraged.   We discussed risks, benefits, and side effects of the above medication and the patient was agreeable with the plan.  Pt's show significant impairment in multiple important areas of functioning. Her prognosis is fair and patient needs to consider importance of continued treatment and medication compliance.   Return in about 4 weeks (around 4/25/2019) for Recheck, Next scheduled follow up.  The patient was instructed to call clinic as needed or go to ER if in crisis.     Spent 20 minutes face to face with patient of which 16 minutes was spent for psychotherapy Assisted patient in processing pt's anxiety. Acknowledged and normalized patient’s thoughts, feelings, and concerns. Utilized cognitive behavioral therapy to assist the patient in recognizing more appropriate coping mechanisms when she becomes agitated/anxious which are proven effective in reducing the severity of frequency of symptoms. Strongly urged her to continue to eat more well balanced and healthier foods in reducing further health risks. There was unconditional positive regard toward patient. Allowed patient to freely discuss issues without interruption or judgment.

## 2019-06-08 DIAGNOSIS — F51.05 INSOMNIA DUE TO MENTAL CONDITION: ICD-10-CM

## 2019-06-10 RX ORDER — AMITRIPTYLINE HYDROCHLORIDE 25 MG/1
TABLET, FILM COATED ORAL
Qty: 30 TABLET | Refills: 0 | Status: SHIPPED | OUTPATIENT
Start: 2019-06-10 | End: 2019-07-11

## 2019-07-11 ENCOUNTER — OFFICE VISIT (OUTPATIENT)
Dept: PSYCHIATRY | Facility: CLINIC | Age: 69
End: 2019-07-11

## 2019-07-11 VITALS
HEART RATE: 95 BPM | HEIGHT: 67 IN | WEIGHT: 192 LBS | BODY MASS INDEX: 30.13 KG/M2 | SYSTOLIC BLOOD PRESSURE: 107 MMHG | DIASTOLIC BLOOD PRESSURE: 72 MMHG

## 2019-07-11 DIAGNOSIS — Z79.899 MEDICATION MANAGEMENT: ICD-10-CM

## 2019-07-11 DIAGNOSIS — F41.1 GENERALIZED ANXIETY DISORDER: ICD-10-CM

## 2019-07-11 DIAGNOSIS — G47.00 INSOMNIA, UNSPECIFIED TYPE: ICD-10-CM

## 2019-07-11 DIAGNOSIS — F34.1 DYSTHYMIA: Primary | ICD-10-CM

## 2019-07-11 PROBLEM — F33.1 MAJOR DEPRESSIVE DISORDER, RECURRENT EPISODE, MODERATE (HCC): Status: ACTIVE | Noted: 2019-07-11

## 2019-07-11 PROCEDURE — 99214 OFFICE O/P EST MOD 30 MIN: CPT | Performed by: NURSE PRACTITIONER

## 2019-07-11 RX ORDER — MELOXICAM 7.5 MG/1
TABLET ORAL
COMMUNITY
Start: 2019-05-13 | End: 2022-07-22

## 2019-07-11 RX ORDER — TRAZODONE HYDROCHLORIDE 50 MG/1
50 TABLET ORAL NIGHTLY
Qty: 90 TABLET | Refills: 0 | Status: SHIPPED | OUTPATIENT
Start: 2019-07-11 | End: 2022-07-22

## 2019-07-11 RX ORDER — CONJUGATED ESTROGENS 0.62 MG/G
CREAM VAGINAL
COMMUNITY
Start: 2019-05-12 | End: 2022-07-22

## 2019-07-11 RX ORDER — DESVENLAFAXINE 100 MG/1
100 TABLET, EXTENDED RELEASE ORAL EVERY MORNING
Qty: 90 TABLET | Refills: 0 | Status: SHIPPED | OUTPATIENT
Start: 2019-07-11 | End: 2022-07-22

## 2019-07-11 NOTE — PROGRESS NOTES
"Subjective   Charito Freeman is a 68 y.o. female who is here today for medication management follow up.    Chief Complaint:Anxiety/Depression     HPI:    Her most recent A1c was 8. Shares she \"has cut down on my meds it is too expensive, the humalog and Lantus.\" She is waiting a response for patient assistance to help with her medications. She continues to have poor sleep, 3-4 hours a night and naps for an hour in the day. She is consistently fatigued. She is currently on iron supplement. Shares she is drinking 3 diet sodas a day, making attempts to improve her diet. She is taking Pristiq as prescribed. She is resistant to consideration of sleep apnea but is agreeable to take a sleep study, prefers in home study. She denies any other medical concerns/stressors. Appetite is good, tolerating diet. Rates anxiety and depression 5/10 with 10 worst on 0-10 scale.  She often feels hopeless, irritable, guilty, anhedonia that is persistent.  Patient has not taken initiative to make active changes in improving mental health.  She reports taking her medication as prescribed and denies side effects.  She does not feel that Elavil is helping with sleep.  She states she has been getting out in the yard and gardening more recently.  Patient is somewhat fatigued and hesitant to response during today's visit.  No suicidal or homicidal ideation.  No auditory or visual hallucinations.  She is difficult to engage.   Denies substance abuse (illegal drugs, alcohol, marijuana, or tobacco).  States the doctor who follows her for her history of leukemia is in [place] and she has not had follow-up with him in over a year.        The following portions of the patient's history were reviewed and updated as appropriate: allergies, current medications, past family history, past medical history, past social history, past surgical history and problem list.    Review of Systems   Constitutional: Positive for fatigue. Negative for activity change and " "appetite change.   HENT: Negative.  Negative for congestion and sinus pressure.    Eyes: Negative for visual disturbance.   Respiratory: Negative for cough and shortness of breath.    Cardiovascular: Negative.    Gastrointestinal: Negative for abdominal distention, abdominal pain, constipation and nausea.   Endocrine: Negative.    Genitourinary: Negative.    Musculoskeletal: Positive for arthralgias, back pain and myalgias.   Skin: Negative.    Allergic/Immunologic: Negative.    Neurological: Negative for dizziness, seizures, numbness and headaches.   Hematological: Negative.    Psychiatric/Behavioral: Positive for agitation, dysphoric mood and sleep disturbance. Negative for behavioral problems, confusion, decreased concentration, hallucinations, self-injury and suicidal ideas. The patient is nervous/anxious. The patient is not hyperactive.        Objective   Physical Exam   Constitutional: She is oriented to person, place, and time. She appears well-developed and well-nourished. No distress.   Neurological: She is alert and oriented to person, place, and time.   Skin: She is not diaphoretic.   Nursing note and vitals reviewed.    Blood pressure 107/72, pulse 95, height 170.2 cm (67.01\"), weight 87.1 kg (192 lb).    Allergies   Allergen Reactions   • Citalopram    • Dasatinib    • Imatinib    • Lisinopril        Current Medications:   Current Outpatient Medications   Medication Sig Dispense Refill   • albuterol (PROVENTIL HFA;VENTOLIN HFA) 108 (90 Base) MCG/ACT inhaler Inhale 2-4 puffs Every 4 (Four) Hours As Needed for Wheezing or Shortness of Air (Cough). 1 inhaler 0   • atorvastatin (LIPITOR) 10 MG tablet      • BOSULIF 100 MG chemo tablet      • desvenlafaxine (PRISTIQ) 100 MG 24 hr tablet Take 1 tablet by mouth Every Morning. 90 tablet 0   • diclofenac (VOLTAREN) 1 % gel gel      • furosemide (LASIX) 20 MG tablet      • HUMALOG KWIKPEN 100 UNIT/ML solution pen-injector      • insulin glargine (LANTUS) 100 " UNIT/ML injection Inject  under the skin Daily.     • LANTUS SOLOSTAR 100 UNIT/ML injection pen      • levothyroxine (SYNTHROID, LEVOTHROID) 25 MCG tablet Daily.     • LYRICA 50 MG capsule      • meloxicam (MOBIC) 7.5 MG tablet      • metFORMIN (GLUCOPHAGE) 1000 MG tablet 2 (Two) Times a Day.     • metoprolol tartrate (LOPRESSOR) 25 MG tablet Take 25 mg by mouth 2 (two) times a day.     • oxybutynin (DITROPAN) 5 MG tablet Take 5 mg by mouth daily.     • pantoprazole (PROTONIX) 40 MG EC tablet      • polyethylene glycol (MIRALAX) packet Take 17 g by mouth Daily. 90 packet 0   • potassium chloride (K-DUR,KLOR-CON) 10 MEQ CR tablet      • PREMARIN 0.625 MG/GM vaginal cream      • raNITIdine (ZANTAC) 300 MG tablet      • traZODone (DESYREL) 50 MG tablet Take 1 tablet by mouth Every Night. 90 tablet 0   • TRULICITY 0.75 MG/0.5ML solution pen-injector        No current facility-administered medications for this visit.        Mental Status Exam:   Hygiene:   fair  Cooperation:  Cooperative  Eye Contact:  Good  Psychomotor Behavior:  Appropriate  Affect:  Full range  Hopelessness: 2  Speech:  Normal  Thought Process:  Linear  Thought Content:  Normal  Suicidal:  None  Homicidal:  None  Hallucinations:  None  Delusion:  None  Memory:  Intact  Orientation:  Person, Place, Time and Situation  Reliability:  fair  Insight:  Fair  Judgement:  Fair  Impulse Control:  Fair  Physical/Medical Issues:  Yes leukemia in remission, dm, GI issues    Assessment/Plan   Diagnoses and all orders for this visit:    Dysthymia  -     desvenlafaxine (PRISTIQ) 100 MG 24 hr tablet; Take 1 tablet by mouth Every Morning.    Generalized anxiety disorder  -     desvenlafaxine (PRISTIQ) 100 MG 24 hr tablet; Take 1 tablet by mouth Every Morning.    Insomnia, unspecified type  -     Ambulatory Referral to Sleep Medicine  -     traZODone (DESYREL) 50 MG tablet; Take 1 tablet by mouth Every Night.    Medication management      Impression: She is having  ongoing and chronic depressive symptoms, irritability. Continue Pristiq to 100mg daily for mood, pain.  Discontinue Elavil and begin trazodone as it has historically helped in the past.  She is agreeable to follow up with sleep medicine for possible sleep study to rule out sleep apnea.   Pt was instructed on medication side effects, benefits, and also of no treatment. Body mass index is 30.06 kg/m².  Patient was educated on healthier and more balanced diet choices and encouraged exercise within physical limitations.   Patient was strongly urged to continue weight maintance or weight loss efforts.  Patient reported verbalized understanding of these instructions  She again refuses to reschedule psychotherapy with Pia or any other provider at this time after encouraged.   We discussed risks, benefits, and side effects of the above medication and the patient was agreeable with the plan.  Pt's show significant impairment in multiple important areas of functioning. Her prognosis is fair and patient needs to consider importance of continued treatment and medication compliance.   Return in about 3 months (around 10/11/2019), or if symptoms worsen or fail to improve, for Recheck, Next scheduled follow up.  The patient was instructed to call clinic as needed or go to ER if in crisis.

## 2019-09-10 DIAGNOSIS — F51.05 INSOMNIA DUE TO MENTAL CONDITION: ICD-10-CM

## 2019-09-10 RX ORDER — AMITRIPTYLINE HYDROCHLORIDE 25 MG/1
TABLET, FILM COATED ORAL
Qty: 30 TABLET | Refills: 1 | OUTPATIENT
Start: 2019-09-10

## 2020-03-26 ENCOUNTER — APPOINTMENT (OUTPATIENT)
Dept: CT IMAGING | Facility: HOSPITAL | Age: 70
End: 2020-03-26

## 2020-03-26 ENCOUNTER — HOSPITAL ENCOUNTER (EMERGENCY)
Facility: HOSPITAL | Age: 70
Discharge: HOME | End: 2020-03-26
Attending: EMERGENCY MEDICINE | Admitting: EMERGENCY MEDICINE

## 2020-03-26 VITALS
OXYGEN SATURATION: 94 % | RESPIRATION RATE: 14 BRPM | HEART RATE: 85 BPM | WEIGHT: 195 LBS | DIASTOLIC BLOOD PRESSURE: 76 MMHG | HEIGHT: 67 IN | BODY MASS INDEX: 30.61 KG/M2 | TEMPERATURE: 98 F | SYSTOLIC BLOOD PRESSURE: 149 MMHG

## 2020-03-26 DIAGNOSIS — R10.9 CHRONIC ABDOMINAL PAIN: Primary | ICD-10-CM

## 2020-03-26 DIAGNOSIS — K59.09 CHRONIC CONSTIPATION: ICD-10-CM

## 2020-03-26 DIAGNOSIS — G89.29 CHRONIC ABDOMINAL PAIN: Primary | ICD-10-CM

## 2020-03-26 LAB
ALBUMIN SERPL-MCNC: 4.7 G/DL (ref 3.5–5.2)
ALBUMIN/GLOB SERPL: 1.4 G/DL
ALP SERPL-CCNC: 98 U/L (ref 39–117)
ALT SERPL W P-5'-P-CCNC: 12 U/L (ref 1–33)
ANION GAP SERPL CALCULATED.3IONS-SCNC: 15 MMOL/L (ref 5–15)
AST SERPL-CCNC: 16 U/L (ref 1–32)
BASOPHILS # BLD AUTO: 0.07 10*3/MM3 (ref 0–0.2)
BASOPHILS NFR BLD AUTO: 0.7 % (ref 0–1.5)
BILIRUB SERPL-MCNC: 0.3 MG/DL (ref 0.2–1.2)
BILIRUB UR QL STRIP: NEGATIVE
BUN BLD-MCNC: 13 MG/DL (ref 8–23)
BUN/CREAT SERPL: 16 (ref 7–25)
CALCIUM SPEC-SCNC: 9.7 MG/DL (ref 8.6–10.5)
CHLORIDE SERPL-SCNC: 99 MMOL/L (ref 98–107)
CLARITY UR: CLEAR
CO2 SERPL-SCNC: 25 MMOL/L (ref 22–29)
COLOR UR: YELLOW
CREAT BLD-MCNC: 0.81 MG/DL (ref 0.57–1)
D-LACTATE SERPL-SCNC: 1.7 MMOL/L (ref 0.5–2)
DEPRECATED RDW RBC AUTO: 45.4 FL (ref 37–54)
EOSINOPHIL # BLD AUTO: 0.3 10*3/MM3 (ref 0–0.4)
EOSINOPHIL NFR BLD AUTO: 3.2 % (ref 0.3–6.2)
ERYTHROCYTE [DISTWIDTH] IN BLOOD BY AUTOMATED COUNT: 14.9 % (ref 12.3–15.4)
GFR SERPL CREATININE-BSD FRML MDRD: 70 ML/MIN/1.73
GLOBULIN UR ELPH-MCNC: 3.3 GM/DL
GLUCOSE BLD-MCNC: 142 MG/DL (ref 65–99)
GLUCOSE UR STRIP-MCNC: NEGATIVE MG/DL
HCT VFR BLD AUTO: 37.4 % (ref 34–46.6)
HGB BLD-MCNC: 11.4 G/DL (ref 12–15.9)
HGB UR QL STRIP.AUTO: NEGATIVE
HOLD SPECIMEN: NORMAL
HOLD SPECIMEN: NORMAL
IMM GRANULOCYTES # BLD AUTO: 0.05 10*3/MM3 (ref 0–0.05)
IMM GRANULOCYTES NFR BLD AUTO: 0.5 % (ref 0–0.5)
KETONES UR QL STRIP: NEGATIVE
LEUKOCYTE ESTERASE UR QL STRIP.AUTO: NEGATIVE
LIPASE SERPL-CCNC: 18 U/L (ref 13–60)
LYMPHOCYTES # BLD AUTO: 1.75 10*3/MM3 (ref 0.7–3.1)
LYMPHOCYTES NFR BLD AUTO: 18.5 % (ref 19.6–45.3)
MCH RBC QN AUTO: 25.7 PG (ref 26.6–33)
MCHC RBC AUTO-ENTMCNC: 30.5 G/DL (ref 31.5–35.7)
MCV RBC AUTO: 84.4 FL (ref 79–97)
MONOCYTES # BLD AUTO: 0.83 10*3/MM3 (ref 0.1–0.9)
MONOCYTES NFR BLD AUTO: 8.8 % (ref 5–12)
NEUTROPHILS # BLD AUTO: 6.45 10*3/MM3 (ref 1.7–7)
NEUTROPHILS NFR BLD AUTO: 68.3 % (ref 42.7–76)
NITRITE UR QL STRIP: NEGATIVE
NRBC BLD AUTO-RTO: 0 /100 WBC (ref 0–0.2)
PH UR STRIP.AUTO: <=5 [PH] (ref 5–8)
PLATELET # BLD AUTO: 275 10*3/MM3 (ref 140–450)
PMV BLD AUTO: 11.8 FL (ref 6–12)
POTASSIUM BLD-SCNC: 3.9 MMOL/L (ref 3.5–5.2)
PROT SERPL-MCNC: 8 G/DL (ref 6–8.5)
PROT UR QL STRIP: NEGATIVE
RBC # BLD AUTO: 4.43 10*6/MM3 (ref 3.77–5.28)
SODIUM BLD-SCNC: 139 MMOL/L (ref 136–145)
SP GR UR STRIP: 1.01 (ref 1–1.03)
UROBILINOGEN UR QL STRIP: NORMAL
WBC NRBC COR # BLD: 9.45 10*3/MM3 (ref 3.4–10.8)
WHOLE BLOOD HOLD SPECIMEN: NORMAL
WHOLE BLOOD HOLD SPECIMEN: NORMAL

## 2020-03-26 PROCEDURE — 99284 EMERGENCY DEPT VISIT MOD MDM: CPT

## 2020-03-26 PROCEDURE — 85025 COMPLETE CBC W/AUTO DIFF WBC: CPT | Performed by: EMERGENCY MEDICINE

## 2020-03-26 PROCEDURE — 25010000002 ONDANSETRON PER 1 MG: Performed by: EMERGENCY MEDICINE

## 2020-03-26 PROCEDURE — 96375 TX/PRO/DX INJ NEW DRUG ADDON: CPT

## 2020-03-26 PROCEDURE — 25010000002 MORPHINE PER 10 MG: Performed by: EMERGENCY MEDICINE

## 2020-03-26 PROCEDURE — 83690 ASSAY OF LIPASE: CPT | Performed by: EMERGENCY MEDICINE

## 2020-03-26 PROCEDURE — 83605 ASSAY OF LACTIC ACID: CPT | Performed by: EMERGENCY MEDICINE

## 2020-03-26 PROCEDURE — 0 DIATRIZOATE MEGLUMINE & SODIUM PER 1 ML: Performed by: EMERGENCY MEDICINE

## 2020-03-26 PROCEDURE — 81003 URINALYSIS AUTO W/O SCOPE: CPT | Performed by: EMERGENCY MEDICINE

## 2020-03-26 PROCEDURE — 74176 CT ABD & PELVIS W/O CONTRAST: CPT

## 2020-03-26 PROCEDURE — 96374 THER/PROPH/DIAG INJ IV PUSH: CPT

## 2020-03-26 PROCEDURE — 80053 COMPREHEN METABOLIC PANEL: CPT | Performed by: EMERGENCY MEDICINE

## 2020-03-26 RX ORDER — DICYCLOMINE HYDROCHLORIDE 10 MG/1
20 CAPSULE ORAL ONCE
Status: COMPLETED | OUTPATIENT
Start: 2020-03-26 | End: 2020-03-26

## 2020-03-26 RX ORDER — MORPHINE SULFATE 2 MG/ML
2 INJECTION, SOLUTION INTRAMUSCULAR; INTRAVENOUS ONCE
Status: COMPLETED | OUTPATIENT
Start: 2020-03-26 | End: 2020-03-26

## 2020-03-26 RX ORDER — ONDANSETRON 2 MG/ML
4 INJECTION INTRAMUSCULAR; INTRAVENOUS ONCE
Status: COMPLETED | OUTPATIENT
Start: 2020-03-26 | End: 2020-03-26

## 2020-03-26 RX ORDER — DICYCLOMINE HYDROCHLORIDE 10 MG/1
10 CAPSULE ORAL 4 TIMES DAILY PRN
Qty: 40 CAPSULE | Refills: 0 | Status: SHIPPED | OUTPATIENT
Start: 2020-03-26 | End: 2022-07-22

## 2020-03-26 RX ADMIN — DIATRIZOATE MEGLUMINE AND DIATRIZOATE SODIUM 15 ML: 660; 100 LIQUID ORAL; RECTAL at 10:44

## 2020-03-26 RX ADMIN — ONDANSETRON 4 MG: 2 INJECTION INTRAMUSCULAR; INTRAVENOUS at 10:44

## 2020-03-26 RX ADMIN — DICYCLOMINE HYDROCHLORIDE 20 MG: 10 CAPSULE ORAL at 13:19

## 2020-03-26 RX ADMIN — MORPHINE SULFATE 2 MG: 2 INJECTION, SOLUTION INTRAMUSCULAR; INTRAVENOUS at 10:44

## 2022-07-22 ENCOUNTER — OFFICE VISIT (OUTPATIENT)
Dept: PULMONOLOGY | Facility: CLINIC | Age: 72
End: 2022-07-22

## 2022-07-22 ENCOUNTER — PATIENT ROUNDING (BHMG ONLY) (OUTPATIENT)
Dept: PULMONOLOGY | Facility: CLINIC | Age: 72
End: 2022-07-22

## 2022-07-22 VITALS
BODY MASS INDEX: 30.45 KG/M2 | OXYGEN SATURATION: 96 % | HEIGHT: 67 IN | WEIGHT: 194 LBS | TEMPERATURE: 97.1 F | SYSTOLIC BLOOD PRESSURE: 148 MMHG | DIASTOLIC BLOOD PRESSURE: 92 MMHG | HEART RATE: 75 BPM

## 2022-07-22 DIAGNOSIS — R06.02 SHORTNESS OF BREATH: Primary | ICD-10-CM

## 2022-07-22 DIAGNOSIS — R91.8 MULTIPLE PULMONARY NODULES: ICD-10-CM

## 2022-07-22 DIAGNOSIS — E66.9 OBESITY (BMI 30-39.9): ICD-10-CM

## 2022-07-22 PROCEDURE — 99204 OFFICE O/P NEW MOD 45 MIN: CPT | Performed by: NURSE PRACTITIONER

## 2022-07-22 RX ORDER — VALSARTAN 40 MG/1
40 TABLET ORAL DAILY
COMMUNITY

## 2022-07-22 RX ORDER — ALBUTEROL SULFATE 90 UG/1
2 AEROSOL, METERED RESPIRATORY (INHALATION) EVERY 4 HOURS PRN
Qty: 18 G | Refills: 11 | Status: SHIPPED | OUTPATIENT
Start: 2022-07-22

## 2022-07-22 RX ORDER — AMLODIPINE BESYLATE 10 MG/1
10 TABLET ORAL DAILY
COMMUNITY

## 2022-07-22 RX ORDER — FAMOTIDINE 20 MG/1
20 TABLET, FILM COATED ORAL 2 TIMES DAILY
COMMUNITY

## 2022-07-22 RX ORDER — PREDNISONE 20 MG/1
40 TABLET ORAL DAILY
Qty: 10 TABLET | Refills: 0 | Status: ON HOLD | OUTPATIENT
Start: 2022-07-22 | End: 2022-10-12

## 2022-07-22 RX ORDER — AZITHROMYCIN 250 MG/1
TABLET, FILM COATED ORAL
Qty: 6 TABLET | Refills: 0 | Status: ON HOLD | OUTPATIENT
Start: 2022-07-22 | End: 2022-10-12

## 2022-07-22 NOTE — PROGRESS NOTES
July 22, 2022    Hello, may I speak with Charito Freeman?    My name is Kae      I am  with [facility]  [address]  [place] [facility] [place] [address] [phone].    Before we get started may I verify your date of birth? 1950    I am calling to officially welcome you to our practice and ask about your recent visit. Is this a good time to talk? yes    Tell me about your visit with us. What things went well?  It was all good.       We're always looking for ways to make our patients' experiences even better. Do you have recommendations on ways we may improve?  no    Overall were you satisfied with your first visit to our practice? yes       I appreciate you taking the time to speak with me today. Is there anything else I can do for you? no      Thank you, and have a great day.

## 2022-07-22 NOTE — PROGRESS NOTES
"Chief Complaint  Shortness of Breath    Subjective        Charito MANDI Freeman presents to [facility]  History of Present Illness     Ms. Freeman is a 71 year old female with a medical history significant for anxiety, depression, diabetes, GERD, leukemia and hypertension.    She presents today for evaluation of shortness of breath.  She reports that her shortness of breath is worse with exertion.  She also has a productive cough.  She reports that it started about 2-3 years ago and has gradually gotten worse.  She is a life long non smoker.  She is currently not on any inhalers.  She tells me that she was evaluated for sleep apnea last year but was told that she does not have sleep apnea.  She does complain of daytime fatigue and feeling unrested in the morning.  She also reports snoring at night.        Objective   Vital Signs:  /92   Pulse 75   Temp 97.1 °F (36.2 °C) (Temporal)   Ht 170.2 cm (67\")   Wt 88 kg (194 lb)   SpO2 96%   BMI 30.38 kg/m²   Estimated body mass index is 30.38 kg/m² as calculated from the following:    Height as of this encounter: 170.2 cm (67\").    Weight as of this encounter: 88 kg (194 lb).    BMI is >= 30 and <35. (Class 1 Obesity). The following options were offered after discussion;: exercise counseling/recommendations and nutrition counseling/recommendations      Physical Exam     GENERAL APPEARANCE: Well developed, well nourished, alert and cooperative, and appears to be in no acute distress.    HEAD: normocephalic. Atraumatic.    EYES: PERRL, EOMI. Vision is grossly intact.    THROAT: Oral cavity and pharynx normal. No inflammation, swelling, exudate, or lesions.     NECK: Neck supple.  No thyromegaly.    CARDIAC: Normal S1 and S2. No S3, S4 or murmurs. Rhythm is regular.     RESPIRATORY:Bilateral air entry positive. Bilateral diminished breath sounds. No wheezing, crackles or rhonchi noted.    GI: Positive bowel sounds. Soft, " nondistended, nontender.     MUSCULOSKELETAL: No significant deformity or joint abnormality. No edema. Peripheral pulses intact. No varicosities.    NEUROLOGICAL: Strength and sensation symmetric and intact throughout.     PSYCHIATRIC: The mental examination revealed the patient was oriented to person, place, and time.     Result Review :  The following data was reviewed by: MERCEDES Delatorre on 07/22/2022:      Data reviewed: hi resolution CT chest          Assessment and Plan   Diagnoses and all orders for this visit:    1. Shortness of breath (Primary)  -     Full Pulmonary Function Test With Bronchodilator; Future    2. Multiple pulmonary nodules  -     CT Chest Without Contrast; Future    3. Obesity (BMI 30-39.9)    Other orders  -     albuterol sulfate  (90 Base) MCG/ACT inhaler; Inhale 2 puffs Every 4 (Four) Hours As Needed for Wheezing.  Dispense: 18 g; Refill: 11  -     predniSONE (DELTASONE) 20 MG tablet; Take 2 tablets by mouth Daily.  Dispense: 10 tablet; Refill: 0  -     azithromycin (ZITHROMAX) 250 MG tablet; Take 2 by mouth today then 1 daily for 4 days  Dispense: 6 tablet; Refill: 0           Ordered PFT to assess lung function.  Sent in prescription for albuterol PRN, azithromycin and prednisone.    Hi resolution CT Chest was reviewed:  No pulmonary fibrosis was noted.  A small pleural effusion and small to moderate pleural effusions were noted.  She also has multiple pulmonary nodules all measuring less than 5 mm.  Will order CT chest without contrast as this CT chest was done in 2018.      Will obtain sleep study from Dr. Barraza.    Follow Up   Return in about 2 months (around 9/22/2022).  Patient was given instructions and counseling regarding her condition or for health maintenance advice. Please see specific information pulled into the AVS if appropriate.

## 2022-08-24 ENCOUNTER — HOSPITAL ENCOUNTER (OUTPATIENT)
Dept: CT IMAGING | Facility: HOSPITAL | Age: 72
Discharge: HOME | End: 2022-08-24

## 2022-08-24 DIAGNOSIS — R91.8 MULTIPLE PULMONARY NODULES: ICD-10-CM

## 2022-08-24 PROCEDURE — 71250 CT THORAX DX C-: CPT

## 2022-09-07 ENCOUNTER — HOSPITAL ENCOUNTER (OUTPATIENT)
Facility: HOSPITAL | Age: 72
Setting detail: HOSPITAL OUTPATIENT SURGERY
End: 2022-09-07
Attending: INTERNAL MEDICINE | Admitting: INTERNAL MEDICINE

## 2022-09-07 DIAGNOSIS — R91.8 MULTIPLE PULMONARY NODULES: Primary | ICD-10-CM

## 2022-09-07 DIAGNOSIS — R06.02 SHORTNESS OF BREATH: ICD-10-CM

## 2022-09-07 NOTE — PROGRESS NOTES
Discussed CT Chest with the patient.  Also discussed imaging with Dr. Butler.  He recommend EBUS.  The procedure was explained to the patient in detail.  We will schedule her for 9/21.  Ordered overnight pulse oximetry study.

## 2022-09-20 ENCOUNTER — TELEPHONE (OUTPATIENT)
Dept: PULMONOLOGY | Facility: CLINIC | Age: 72
End: 2022-09-20

## 2022-09-20 DIAGNOSIS — G47.34 NOCTURNAL HYPOXIA: Primary | ICD-10-CM

## 2022-09-20 DIAGNOSIS — R91.8 MULTIPLE PULMONARY NODULES: Primary | ICD-10-CM

## 2022-09-20 NOTE — TELEPHONE ENCOUNTER
Called and spoke with patient to report overnight pulse ox results. Patient will require oxygen while sleeping at night. Order has oscar placed and sent to [facility] for setup.

## 2022-09-20 NOTE — PROGRESS NOTES
Overnight pulse oximetry study was reviewed.  She will need supplemental oxygen for oxygen desaturation noted during sleep. Placed order for oxygen.

## 2022-10-05 ENCOUNTER — TRANSCRIBE ORDERS (OUTPATIENT)
Dept: ADMINISTRATIVE | Facility: HOSPITAL | Age: 72
End: 2022-10-05

## 2022-10-05 DIAGNOSIS — J32.2 CHRONIC ETHMOIDAL SINUSITIS: Primary | ICD-10-CM

## 2022-10-12 ENCOUNTER — ANESTHESIA (OUTPATIENT)
Dept: PERIOP | Facility: HOSPITAL | Age: 72
End: 2022-10-12

## 2022-10-12 ENCOUNTER — ANESTHESIA EVENT (OUTPATIENT)
Dept: PERIOP | Facility: HOSPITAL | Age: 72
End: 2022-10-12

## 2022-10-12 ENCOUNTER — HOSPITAL ENCOUNTER (OUTPATIENT)
Facility: HOSPITAL | Age: 72
Setting detail: HOSPITAL OUTPATIENT SURGERY
Discharge: HOME | End: 2022-10-12
Attending: INTERNAL MEDICINE | Admitting: INTERNAL MEDICINE

## 2022-10-12 VITALS
WEIGHT: 195 LBS | DIASTOLIC BLOOD PRESSURE: 82 MMHG | OXYGEN SATURATION: 96 % | HEART RATE: 87 BPM | SYSTOLIC BLOOD PRESSURE: 128 MMHG | HEIGHT: 67 IN | BODY MASS INDEX: 30.61 KG/M2 | RESPIRATION RATE: 20 BRPM | TEMPERATURE: 97.8 F

## 2022-10-12 DIAGNOSIS — R91.8 MULTIPLE PULMONARY NODULES: ICD-10-CM

## 2022-10-12 LAB
ALBUMIN SERPL-MCNC: 4.53 G/DL (ref 3.5–5.2)
ALBUMIN/GLOB SERPL: 1.3 G/DL
ALP SERPL-CCNC: 93 U/L (ref 39–117)
ALT SERPL W P-5'-P-CCNC: 10 U/L (ref 1–33)
ANION GAP SERPL CALCULATED.3IONS-SCNC: 12.5 MMOL/L (ref 5–15)
AST SERPL-CCNC: 13 U/L (ref 1–32)
BASOPHILS # BLD AUTO: 0.12 10*3/MM3 (ref 0–0.2)
BASOPHILS NFR BLD AUTO: 0.8 % (ref 0–1.5)
BILIRUB SERPL-MCNC: 0.3 MG/DL (ref 0–1.2)
BUN SERPL-MCNC: 17 MG/DL (ref 8–23)
BUN/CREAT SERPL: 16.8 (ref 7–25)
CALCIUM SPEC-SCNC: 10 MG/DL (ref 8.6–10.5)
CHLORIDE SERPL-SCNC: 97 MMOL/L (ref 98–107)
CO2 SERPL-SCNC: 25.5 MMOL/L (ref 22–29)
CREAT SERPL-MCNC: 1.01 MG/DL (ref 0.57–1)
DEPRECATED RDW RBC AUTO: 45.6 FL (ref 37–54)
EGFRCR SERPLBLD CKD-EPI 2021: 58.9 ML/MIN/1.73
EOSINOPHIL # BLD AUTO: 0.25 10*3/MM3 (ref 0–0.4)
EOSINOPHIL NFR BLD AUTO: 1.7 % (ref 0.3–6.2)
ERYTHROCYTE [DISTWIDTH] IN BLOOD BY AUTOMATED COUNT: 15.2 % (ref 12.3–15.4)
GLOBULIN UR ELPH-MCNC: 3.5 GM/DL
GLUCOSE BLDC GLUCOMTR-MCNC: 107 MG/DL (ref 70–130)
GLUCOSE SERPL-MCNC: 120 MG/DL (ref 65–99)
HCT VFR BLD AUTO: 38.1 % (ref 34–46.6)
HGB BLD-MCNC: 12.4 G/DL (ref 12–15.9)
IMM GRANULOCYTES # BLD AUTO: 0.15 10*3/MM3 (ref 0–0.05)
IMM GRANULOCYTES NFR BLD AUTO: 1 % (ref 0–0.5)
INR PPP: 1 (ref 0.9–1.1)
LYMPHOCYTES # BLD AUTO: 2.62 10*3/MM3 (ref 0.7–3.1)
LYMPHOCYTES NFR BLD AUTO: 18 % (ref 19.6–45.3)
MCH RBC QN AUTO: 26.8 PG (ref 26.6–33)
MCHC RBC AUTO-ENTMCNC: 32.5 G/DL (ref 31.5–35.7)
MCV RBC AUTO: 82.3 FL (ref 79–97)
MONOCYTES # BLD AUTO: 1.12 10*3/MM3 (ref 0.1–0.9)
MONOCYTES NFR BLD AUTO: 7.7 % (ref 5–12)
NEUTROPHILS NFR BLD AUTO: 10.27 10*3/MM3 (ref 1.7–7)
NEUTROPHILS NFR BLD AUTO: 70.8 % (ref 42.7–76)
NRBC BLD AUTO-RTO: 0 /100 WBC (ref 0–0.2)
PLATELET # BLD AUTO: 277 10*3/MM3 (ref 140–450)
PMV BLD AUTO: 9.7 FL (ref 6–12)
POTASSIUM SERPL-SCNC: 4.5 MMOL/L (ref 3.5–5.2)
PROT SERPL-MCNC: 8 G/DL (ref 6–8.5)
PROTHROMBIN TIME: 13.3 SECONDS (ref 12.1–14.7)
RBC # BLD AUTO: 4.63 10*6/MM3 (ref 3.77–5.28)
SODIUM SERPL-SCNC: 135 MMOL/L (ref 136–145)
WBC NRBC COR # BLD: 14.53 10*3/MM3 (ref 3.4–10.8)

## 2022-10-12 PROCEDURE — 87102 FUNGUS ISOLATION CULTURE: CPT | Performed by: INTERNAL MEDICINE

## 2022-10-12 PROCEDURE — 88112 CYTOPATH CELL ENHANCE TECH: CPT

## 2022-10-12 PROCEDURE — 80053 COMPREHEN METABOLIC PANEL: CPT | Performed by: INTERNAL MEDICINE

## 2022-10-12 PROCEDURE — 87116 MYCOBACTERIA CULTURE: CPT | Performed by: INTERNAL MEDICINE

## 2022-10-12 PROCEDURE — 82962 GLUCOSE BLOOD TEST: CPT

## 2022-10-12 PROCEDURE — 87071 CULTURE AEROBIC QUANT OTHER: CPT | Performed by: INTERNAL MEDICINE

## 2022-10-12 PROCEDURE — 87205 SMEAR GRAM STAIN: CPT | Performed by: INTERNAL MEDICINE

## 2022-10-12 PROCEDURE — 25010000002 ONDANSETRON PER 1 MG: Performed by: NURSE ANESTHETIST, CERTIFIED REGISTERED

## 2022-10-12 PROCEDURE — 87206 SMEAR FLUORESCENT/ACID STAI: CPT | Performed by: INTERNAL MEDICINE

## 2022-10-12 PROCEDURE — 94799 UNLISTED PULMONARY SVC/PX: CPT

## 2022-10-12 PROCEDURE — 31624 DX BRONCHOSCOPE/LAVAGE: CPT | Performed by: INTERNAL MEDICINE

## 2022-10-12 PROCEDURE — 85610 PROTHROMBIN TIME: CPT | Performed by: INTERNAL MEDICINE

## 2022-10-12 PROCEDURE — 25010000002 PROPOFOL 10 MG/ML EMULSION: Performed by: NURSE ANESTHETIST, CERTIFIED REGISTERED

## 2022-10-12 PROCEDURE — 31652 BRONCH EBUS SAMPLNG 1/2 NODE: CPT | Performed by: INTERNAL MEDICINE

## 2022-10-12 PROCEDURE — 31623 DX BRONCHOSCOPE/BRUSH: CPT | Performed by: INTERNAL MEDICINE

## 2022-10-12 PROCEDURE — 85025 COMPLETE CBC W/AUTO DIFF WBC: CPT | Performed by: INTERNAL MEDICINE

## 2022-10-12 PROCEDURE — 94640 AIRWAY INHALATION TREATMENT: CPT

## 2022-10-12 PROCEDURE — 88173 CYTOPATH EVAL FNA REPORT: CPT

## 2022-10-12 RX ORDER — ALBUTEROL SULFATE 2.5 MG/3ML
2.5 SOLUTION RESPIRATORY (INHALATION) ONCE
Status: COMPLETED | OUTPATIENT
Start: 2022-10-12 | End: 2022-10-12

## 2022-10-12 RX ORDER — EPHEDRINE SULFATE 5 MG/ML
INJECTION INTRAVENOUS AS NEEDED
Status: DISCONTINUED | OUTPATIENT
Start: 2022-10-12 | End: 2022-10-12 | Stop reason: SURG

## 2022-10-12 RX ORDER — LIDOCAINE HYDROCHLORIDE 10 MG/ML
INJECTION, SOLUTION EPIDURAL; INFILTRATION; INTRACAUDAL; PERINEURAL AS NEEDED
Status: DISCONTINUED | OUTPATIENT
Start: 2022-10-12 | End: 2022-10-12 | Stop reason: HOSPADM

## 2022-10-12 RX ORDER — MIDAZOLAM HYDROCHLORIDE 1 MG/ML
0.5 INJECTION INTRAMUSCULAR; INTRAVENOUS
Status: DISCONTINUED | OUTPATIENT
Start: 2022-10-12 | End: 2022-10-12 | Stop reason: HOSPADM

## 2022-10-12 RX ORDER — LIDOCAINE HYDROCHLORIDE 40 MG/ML
3 INJECTION, SOLUTION RETROBULBAR; TOPICAL ONCE
Status: COMPLETED | OUTPATIENT
Start: 2022-10-12 | End: 2022-10-12

## 2022-10-12 RX ORDER — SODIUM CHLORIDE, SODIUM LACTATE, POTASSIUM CHLORIDE, CALCIUM CHLORIDE 600; 310; 30; 20 MG/100ML; MG/100ML; MG/100ML; MG/100ML
100 INJECTION, SOLUTION INTRAVENOUS ONCE AS NEEDED
Status: DISCONTINUED | OUTPATIENT
Start: 2022-10-12 | End: 2022-10-12 | Stop reason: HOSPADM

## 2022-10-12 RX ORDER — LIDOCAINE HYDROCHLORIDE 20 MG/ML
INJECTION, SOLUTION EPIDURAL; INFILTRATION; INTRACAUDAL; PERINEURAL AS NEEDED
Status: DISCONTINUED | OUTPATIENT
Start: 2022-10-12 | End: 2022-10-12 | Stop reason: SURG

## 2022-10-12 RX ORDER — FAMOTIDINE 10 MG/ML
INJECTION, SOLUTION INTRAVENOUS AS NEEDED
Status: DISCONTINUED | OUTPATIENT
Start: 2022-10-12 | End: 2022-10-12 | Stop reason: SURG

## 2022-10-12 RX ORDER — PROPOFOL 10 MG/ML
VIAL (ML) INTRAVENOUS AS NEEDED
Status: DISCONTINUED | OUTPATIENT
Start: 2022-10-12 | End: 2022-10-12 | Stop reason: SURG

## 2022-10-12 RX ORDER — OXYCODONE HYDROCHLORIDE AND ACETAMINOPHEN 5; 325 MG/1; MG/1
1 TABLET ORAL ONCE AS NEEDED
Status: DISCONTINUED | OUTPATIENT
Start: 2022-10-12 | End: 2022-10-12 | Stop reason: HOSPADM

## 2022-10-12 RX ORDER — SODIUM CHLORIDE 0.9 % (FLUSH) 0.9 %
10 SYRINGE (ML) INJECTION EVERY 12 HOURS SCHEDULED
Status: DISCONTINUED | OUTPATIENT
Start: 2022-10-12 | End: 2022-10-12 | Stop reason: HOSPADM

## 2022-10-12 RX ORDER — ONDANSETRON 2 MG/ML
INJECTION INTRAMUSCULAR; INTRAVENOUS AS NEEDED
Status: DISCONTINUED | OUTPATIENT
Start: 2022-10-12 | End: 2022-10-12 | Stop reason: SURG

## 2022-10-12 RX ORDER — SODIUM CHLORIDE 9 MG/ML
INJECTION, SOLUTION INTRAVENOUS AS NEEDED
Status: DISCONTINUED | OUTPATIENT
Start: 2022-10-12 | End: 2022-10-12 | Stop reason: HOSPADM

## 2022-10-12 RX ORDER — SODIUM CHLORIDE, SODIUM LACTATE, POTASSIUM CHLORIDE, CALCIUM CHLORIDE 600; 310; 30; 20 MG/100ML; MG/100ML; MG/100ML; MG/100ML
INJECTION, SOLUTION INTRAVENOUS CONTINUOUS PRN
Status: DISCONTINUED | OUTPATIENT
Start: 2022-10-12 | End: 2022-10-12 | Stop reason: SURG

## 2022-10-12 RX ORDER — IPRATROPIUM BROMIDE AND ALBUTEROL SULFATE 2.5; .5 MG/3ML; MG/3ML
3 SOLUTION RESPIRATORY (INHALATION) ONCE AS NEEDED
Status: COMPLETED | OUTPATIENT
Start: 2022-10-12 | End: 2022-10-12

## 2022-10-12 RX ORDER — FENTANYL CITRATE 50 UG/ML
50 INJECTION, SOLUTION INTRAMUSCULAR; INTRAVENOUS
Status: DISCONTINUED | OUTPATIENT
Start: 2022-10-12 | End: 2022-10-12 | Stop reason: HOSPADM

## 2022-10-12 RX ORDER — SODIUM CHLORIDE 0.9 % (FLUSH) 0.9 %
10 SYRINGE (ML) INJECTION AS NEEDED
Status: DISCONTINUED | OUTPATIENT
Start: 2022-10-12 | End: 2022-10-12 | Stop reason: HOSPADM

## 2022-10-12 RX ORDER — SODIUM CHLORIDE, SODIUM LACTATE, POTASSIUM CHLORIDE, CALCIUM CHLORIDE 600; 310; 30; 20 MG/100ML; MG/100ML; MG/100ML; MG/100ML
125 INJECTION, SOLUTION INTRAVENOUS ONCE
Status: DISCONTINUED | OUTPATIENT
Start: 2022-10-12 | End: 2022-10-12 | Stop reason: HOSPADM

## 2022-10-12 RX ORDER — KETOROLAC TROMETHAMINE 30 MG/ML
15 INJECTION, SOLUTION INTRAMUSCULAR; INTRAVENOUS EVERY 6 HOURS PRN
Status: DISCONTINUED | OUTPATIENT
Start: 2022-10-12 | End: 2022-10-12 | Stop reason: HOSPADM

## 2022-10-12 RX ORDER — ONDANSETRON 2 MG/ML
4 INJECTION INTRAMUSCULAR; INTRAVENOUS AS NEEDED
Status: DISCONTINUED | OUTPATIENT
Start: 2022-10-12 | End: 2022-10-12 | Stop reason: HOSPADM

## 2022-10-12 RX ADMIN — LIDOCAINE HYDROCHLORIDE 3 ML: 40 INJECTION, SOLUTION RETROBULBAR; TOPICAL at 10:41

## 2022-10-12 RX ADMIN — ALBUTEROL SULFATE 2.5 MG: 2.5 SOLUTION RESPIRATORY (INHALATION) at 10:35

## 2022-10-12 RX ADMIN — ONDANSETRON 4 MG: 2 INJECTION INTRAMUSCULAR; INTRAVENOUS at 11:27

## 2022-10-12 RX ADMIN — PROPOFOL 150 MG: 10 INJECTION, EMULSION INTRAVENOUS at 11:27

## 2022-10-12 RX ADMIN — FAMOTIDINE 20 MG: 10 INJECTION, SOLUTION INTRAVENOUS at 11:27

## 2022-10-12 RX ADMIN — EPHEDRINE SULFATE 10 MG: 5 INJECTION INTRAVENOUS at 11:32

## 2022-10-12 RX ADMIN — LIDOCAINE HYDROCHLORIDE 60 MG: 20 INJECTION, SOLUTION EPIDURAL; INFILTRATION; INTRACAUDAL; PERINEURAL at 11:27

## 2022-10-12 RX ADMIN — SODIUM CHLORIDE, POTASSIUM CHLORIDE, SODIUM LACTATE AND CALCIUM CHLORIDE: 600; 310; 30; 20 INJECTION, SOLUTION INTRAVENOUS at 11:23

## 2022-10-12 RX ADMIN — IPRATROPIUM BROMIDE AND ALBUTEROL SULFATE 3 ML: .5; 2.5 SOLUTION RESPIRATORY (INHALATION) at 11:56

## 2022-10-12 NOTE — H&P
Chief Complaint:  Here for a bronchoscopy for abnormal imaging - ct chest    Ct chest reviewed     Subjective     Interval History: no changes since the last time since was seen in office        Review of Systems:    Review of system system is negative for shortness of breath chest pain lightheadedness dizziness any numbness in any area of the body belly pain nausea vomiting diarrhea shortness of breath cough          Vital Signs  Temp:  [97.8 °F (36.6 °C)] 97.8 °F (36.6 °C)  Heart Rate:  [80] 80  Resp:  [20] 20  BP: (127)/(77) 127/77  Body mass index is 30.54 kg/m².  No intake or output data in the 24 hours ending 10/12/22 1026  No intake/output data recorded.    Physical Exam:   General- normal in appearance, not in any acute distress    HEENT- pupils equally reactive to light, normal in size, no scleral icterus    Neck-supple    Chest-respirations normal-on auscultation no wheezing no crackles    S1 and S2 normal    Abdomen-nontender nondistended bowel sounds positive    CNS-nonfocal    Extremities-no edema    Psychiatric-mood good, good eye contact, alert awake oriented     Results Review:     I reviewed the patient's new clinical results.  Results from last 7 days   Lab Units 10/12/22  0946   WBC 10*3/mm3 14.53*   HEMOGLOBIN g/dL 12.4   PLATELETS 10*3/mm3 277         Lab Results   Component Value Date    INR 1.00 10/12/2022    PROTIME 13.3 10/12/2022           Invalid input(s): PROT, LABALBU      Imaging Results (Last 24 Hours)     ** No results found for the last 24 hours. **               lactated ringers, 125 mL/hr, Intravenous, Once  sodium chloride, 10 mL, Intravenous, Q12H           Medication Review:     Assessment & Plan     Abnormal ct chest- all the medications history physical labs are reviewed.  We will do a bronchoscopy with bronchoalveolar lavage his bronchial brushings.  We'll send it for cytology and microbiology.    Informed consent taken.      Patient Active Problem List   Diagnosis Code    • CGL (chronic granulocytic leukemia) (HCC) C92.10   • Chest pain R07.9   • Anxiety and depression F41.9, F32.A   • Diabetes mellitus type 2, noninsulin dependent (HCC) E11.9   • Breathlessness on exertion R06.81   • HLD (hyperlipidemia) E78.5   • BP (high blood pressure) I10   • Awareness of heartbeats R00.2   • Adult hypothyroidism E03.9   • Major depressive disorder, recurrent episode, moderate (HCC) F33.1   • Multiple pulmonary nodules R91.8               Ziggy Butler MD  10/12/22  10:26 EDT

## 2022-10-12 NOTE — OP NOTE
Bronchoscopy Procedure Note    Date of Operation: 10/12/2022    Pre-op Diagnosis: Abnormal CT chest with bilateral pulmonary nodules    Post-op Diagnosis: Abnormal CT chest with bilateral pulmonary nodules    Surgeon: Ziggy Butler MD    Assistants: None    Anesthesia: Please see anesthesia report for details    Operation: Flexible fiberoptic bronchoscopy, diagnostic     Findings: No endobronchial lesions or masses were seen  On endobronchial ultrasound station 7 lymph node was seen to be enlarged 2 x 2 cm    Specimen: Bronchial alveolar lavage of right upper lobe    Bronchoalveolar lavage of right middle lobe  Bronchoalveolar lavage of left upper lobe    Endobronchial ultrasound-guided transbronchial needle aspiration of station 7 lymph node    Estimated Blood Loss: Minimal    Complications: None    Indications and History:  The patient is a 72 y.o. female with abnormal CT chest with bilateral pulmonary nodules.  The risks, benefits, complications, treatment options and expected outcomes were discussed with the patient.  The possibilities of reaction to medication, pulmonary aspiration, perforation of a viscus, bleeding, failure to diagnose a condition and creating a complication requiring transfusion or operation were discussed with the patient who freely signed the consent.      Description of Procedure:  The patient was seen in the Holding Room and the site of surgery properly noted/marked.  The patient was taken to endoscopy suite, identified as [facility] and the procedure verified as Flexible Fiberoptic Bronchoscopy.  A Time Out was held and the above information confirmed.     The bronchoscope was passed through the LMA. The vocal cords were visualized and  2 ml 1 % lidocaine was topically placed onto the cords. The cords were normal. The scope was then passed into the trachea.      2 ml 1 % lidocaine was used topically on the valerie.  Careful inspection of the tracheal lumen was accomplished. The scope  was sequentially passed into the left main and then left upper and lower bronchi and segmental bronchi.       The scope was then withdrawn and advanced into the right main bronchus and then into the RUL, RML, and RLL bronchi and segmental bronchi.       Samples-  Bronchial washings    Then the bronchoscope was wedged into the right upper lobe and 20 cc of saline was given for bronchoalveolar lavage and close to 12 cc was aspirated back.    Then the bronchoscope was wedged into the right middle lobe for a bronchoalveolar lavage 40 cc of saline was given once in close to 17 cc was aspirated back.    Then the bronchoscope was wedged into the left upper lobe for a bronchoalveolar lavage and close to 20 cc of saline was given and close to 10 cc was aspirated back.    Then the endobronchial ultrasound was introduced.  Mediastinal survey was done.  Station 7 lymph node was seen to be enlarged.  Endobronchial ultrasound-guided transbronchial needle aspiration of station 7 lymph node was done once.  Patient tolerated the procedure well.          Samples were sent for cytology and microbiology.  Please follow up the results  The Patient was taken to the Endoscopy Recovery area in satisfactory condition.        Ziggy Butler MD

## 2022-10-12 NOTE — ANESTHESIA POSTPROCEDURE EVALUATION
Patient: Charito Freeman    Procedure Summary     Date: 10/12/22 Room / Location: [facility] OR  /  [facility] OR    Anesthesia Start: 1123 Anesthesia Stop: 1143    Procedure: BRONCHOSCOPY WITH ENDOBRONCHIAL ULTRASOUND (Bronchus) Diagnosis:       Multiple pulmonary nodules      (Multiple pulmonary nodules [R91.8])    Surgeons: Ziggy Butler MD Provider: Irvin Mensah MD    Anesthesia Type: general ASA Status: 4          Anesthesia Type: general    Vitals  Vitals Value Taken Time   /82 10/12/22 1225   Temp 97.8 °F (36.6 °C) 10/12/22 1145   Pulse 87 10/12/22 1225   Resp 20 10/12/22 1225   SpO2 96 % 10/12/22 1225           Post Anesthesia Care and Evaluation    Patient location during evaluation: PACU  Patient participation: complete - patient participated  Level of consciousness: awake  Pain score: 0  Pain management: adequate    Airway patency: patent  Anesthetic complications: No anesthetic complications  PONV Status: none  Cardiovascular status: hemodynamically stable  Respiratory status: nasal cannula  Hydration status: acceptable

## 2022-10-12 NOTE — ANESTHESIA PREPROCEDURE EVALUATION
Anesthesia Evaluation     Patient summary reviewed and Nursing notes reviewed   no history of anesthetic complications:  NPO Solid Status: > 8 hours  NPO Liquid Status: > 8 hours           Airway   Mallampati: I  TM distance: >3 FB  Dental    (+) edentulous, upper dentures and lower dentures    Pulmonary     breath sounds clear to auscultation  (+) home oxygen (2 liters at night), shortness of breath,   Cardiovascular   Exercise tolerance: good (4-7 METS)    Rhythm: regular  Rate: normal    (+) hypertension, hyperlipidemia,       Neuro/Psych  (+) psychiatric history Anxiety and Depression,    GI/Hepatic/Renal/Endo    (+) obesity,  GERD,  diabetes mellitus, thyroid problem     Musculoskeletal (-) negative ROS    Abdominal   (+) obese,     Abdomen: soft.   Substance History - negative use     OB/GYN negative ob/gyn ROS         Other   blood dyscrasia (leukemia),   history of cancer active                    Anesthesia Plan    ASA 4     general     intravenous induction     Anesthetic plan, risks, benefits, and alternatives have been provided, discussed and informed consent has been obtained with: patient.    Use of blood products discussed with consented to blood products.   Plan discussed with CRNA.        CODE STATUS:

## 2022-10-12 NOTE — ANESTHESIA PROCEDURE NOTES
Airway  Urgency: elective    Airway not difficult    General Information and Staff    Patient location during procedure: OR  Anesthesiologist: Irvin Mensah MD  CRNA/CAA: Vito Thomas CRNA    Indications and Patient Condition  Indications for airway management: airway protection    Preoxygenated: yes  MILS maintained throughout  Mask difficulty assessment: 0 - not attempted    Final Airway Details  Final airway type: supraglottic airway      Successful airway: unique  Size 4     Number of attempts at approach: 1  Assessment: lips, teeth, and gum same as pre-op and atraumatic intubation    Additional Comments  Dentition & lips unchanged from preop

## 2022-10-13 LAB — REF LAB TEST METHOD: NORMAL

## 2022-10-14 LAB
BACTERIA SPEC AEROBE CULT: NORMAL
GRAM STN SPEC: NORMAL

## 2022-10-20 ENCOUNTER — OFFICE VISIT (OUTPATIENT)
Dept: PULMONOLOGY | Facility: CLINIC | Age: 72
End: 2022-10-20

## 2022-10-20 VITALS
WEIGHT: 195 LBS | HEART RATE: 91 BPM | DIASTOLIC BLOOD PRESSURE: 90 MMHG | BODY MASS INDEX: 30.61 KG/M2 | TEMPERATURE: 97.5 F | HEIGHT: 67 IN | SYSTOLIC BLOOD PRESSURE: 126 MMHG | OXYGEN SATURATION: 98 %

## 2022-10-20 DIAGNOSIS — R06.02 SHORTNESS OF BREATH: Primary | ICD-10-CM

## 2022-10-20 DIAGNOSIS — R91.8 MULTIPLE PULMONARY NODULES: ICD-10-CM

## 2022-10-20 DIAGNOSIS — G47.34 NOCTURNAL HYPOXIA: ICD-10-CM

## 2022-10-20 DIAGNOSIS — Z23 NEED FOR INFLUENZA VACCINATION: ICD-10-CM

## 2022-10-20 PROCEDURE — 99214 OFFICE O/P EST MOD 30 MIN: CPT | Performed by: NURSE PRACTITIONER

## 2022-10-20 PROCEDURE — 94618 PULMONARY STRESS TESTING: CPT | Performed by: NURSE PRACTITIONER

## 2022-10-20 PROCEDURE — 90662 IIV NO PRSV INCREASED AG IM: CPT | Performed by: NURSE PRACTITIONER

## 2022-10-20 PROCEDURE — G0008 ADMIN INFLUENZA VIRUS VAC: HCPCS | Performed by: NURSE PRACTITIONER

## 2022-10-20 RX ORDER — FLUTICASONE FUROATE 100 UG/1
1 POWDER RESPIRATORY (INHALATION) DAILY
Qty: 30 EACH | Refills: 5 | Status: SHIPPED | OUTPATIENT
Start: 2022-10-20

## 2022-10-20 NOTE — PROGRESS NOTES
"Chief Complaint  Shortness of Breath    Subjective        Charito MANDI Freeman presents to [facility] PULMONARY & CRITICAL CARE MEDICINE  History of Present Illness      Ms. Freeman is a 72 year old female with a medical history significant for anxiety, depression, hypothyroidism, diabetes, GERD and hypertension.    She presents today for follow up on shortness of breath.  She reports that she feels that her breathing is getting worse.  She reports that it is worse with exertion.  She underwent bronchoscopy with Dr. Butler.  She tells me that she is using supplemental oxygen at night.  She was unable to complete PFT.  Albuterol is not helping her shortness of breath.      Objective   Vital Signs:  /90   Pulse 91   Temp 97.5 °F (36.4 °C) (Temporal)   Ht 170.2 cm (67\")   Wt 88.5 kg (195 lb)   SpO2 98%   BMI 30.54 kg/m²   Estimated body mass index is 30.54 kg/m² as calculated from the following:    Height as of this encounter: 170.2 cm (67\").    Weight as of this encounter: 88.5 kg (195 lb).    BMI is >= 30 and <35. (Class 1 Obesity). The following options were offered after discussion;: exercise counseling/recommendations and nutrition counseling/recommendations      Physical Exam   GENERAL APPEARANCE: Well developed, well nourished, alert and cooperative, and appears to be in no acute distress.    HEAD: normocephalic. Atraumatic.    EYES: PERRL, EOMI. Vision is grossly intact.    THROAT: Oral cavity and pharynx normal. No inflammation, swelling, exudate, or lesions.     NECK: Neck supple.  No thyromegaly.    CARDIAC: Normal S1 and S2. No S3, S4 or murmurs. Rhythm is regular.     RESPIRATORY:Bilateral air entry positive. Bilateral diminished breath sounds. No wheezing, crackles or rhonchi noted.    GI: Positive bowel sounds. Soft, nondistended, nontender.     MUSCULOSKELETAL: No significant deformity or joint abnormality. No edema. Peripheral pulses intact. No varicosities.    NEUROLOGICAL: " Strength and sensation symmetric and intact throughout.     PSYCHIATRIC: The mental examination revealed the patient was oriented to person, place, and time.     Result Review :{Labs  Result Review  Imaging  Med Tab  Media  Procedures  :23}  The following data was reviewed by: MERCEDES Delatorre on 10/20/2022:  Common labs    Common Labs 10/12/22 10/12/22    0946 0946   Glucose  120 (A)   BUN  17   Creatinine  1.01 (A)   Sodium  135 (A)   Potassium  4.5   Chloride  97 (A)   Calcium  10.0   Albumin  4.53   Total Bilirubin  0.3   Alkaline Phosphatase  93   AST (SGOT)  13   ALT (SGPT)  10   WBC 14.53 (A)    Hemoglobin 12.4    Hematocrit 38.1    Platelets 277    (A) Abnormal value                   Assessment and Plan   Diagnoses and all orders for this visit:    1. Shortness of breath (Primary)  -     Adult Transthoracic Echo Complete W/ Cont if Necessary Per Protocol; Future    2. Need for influenza vaccination    3. Multiple pulmonary nodules  -     CT Chest Without Contrast; Future    4. Nocturnal hypoxia    Other orders  -     Fluticasone Furoate (Arnuity Ellipta) 100 MCG/ACT aerosol powder ; Inhale 1 puff Daily.  Dispense: 30 each; Refill: 5  -     Fluzone High-Dose 65+yrs ([id number])      She underwent bronchoscopy with Dr. Butler since her last visit.  Microbiology is noted so far been negative.  Pathology was also noted to be negative for malignancy.  We will repeat CT chest in 6 months.    She was unable to complete PFT.  Albuterol does not seem to be a significant help for her shortness of breath.  We will start her on Anoro once daily.    She is compliant with use of supplemental oxygen at nighttime.    She is requesting a POC to use during the day.  Oxygen saturation at rest is noted to be 98% on room air.  She underwent 6-minute walk test which showed no oxygen desaturation below 88%.  Advised her that she does qualify for oxygen during ambulation at this time and that her insurance would not  cover a POC.    Ordered echo to evaluate other sources of shortness of breath.    She received influenza vaccine in office.        Follow Up   Return in about 6 weeks (around 12/1/2022).  Patient was given instructions and counseling regarding her condition or for health maintenance advice. Please see specific information pulled into the AVS if appropriate.

## 2022-11-02 ENCOUNTER — HOSPITAL ENCOUNTER (OUTPATIENT)
Dept: CARDIOLOGY | Facility: HOSPITAL | Age: 72
End: 2022-11-02

## 2022-11-10 LAB
FUNGUS SPEC CULT: NORMAL
FUNGUS SPEC FUNGUS STN: NORMAL

## 2022-11-26 LAB
ACID FAST STN SPEC: NEGATIVE
MYCOBACTERIUM SPEC QL CULT: NEGATIVE
SPECIMEN PREPARATION: NORMAL

## 2022-11-30 ENCOUNTER — HOSPITAL ENCOUNTER (OUTPATIENT)
Dept: CARDIOLOGY | Facility: HOSPITAL | Age: 72
End: 2022-11-30

## 2022-12-28 ENCOUNTER — HOSPITAL ENCOUNTER (OUTPATIENT)
Dept: CARDIOLOGY | Facility: HOSPITAL | Age: 72
End: 2022-12-28

## 2023-01-18 ENCOUNTER — HOSPITAL ENCOUNTER (OUTPATIENT)
Dept: RESPIRATORY THERAPY | Facility: HOSPITAL | Age: 73
Discharge: HOME | End: 2023-01-18
Payer: MEDICARE

## 2023-01-18 ENCOUNTER — HOSPITAL ENCOUNTER (OUTPATIENT)
Dept: CARDIOLOGY | Facility: HOSPITAL | Age: 73
Discharge: HOME | End: 2023-01-18
Payer: MEDICARE

## 2023-01-18 ENCOUNTER — TRANSCRIBE ORDERS (OUTPATIENT)
Dept: ADMINISTRATIVE | Facility: HOSPITAL | Age: 73
End: 2023-01-18
Payer: MEDICARE

## 2023-01-18 DIAGNOSIS — R06.02 SOB (SHORTNESS OF BREATH): ICD-10-CM

## 2023-01-18 DIAGNOSIS — R06.02 SOB (SHORTNESS OF BREATH): Primary | ICD-10-CM

## 2023-01-18 DIAGNOSIS — R06.02 SHORTNESS OF BREATH: ICD-10-CM

## 2023-01-18 LAB
BH CV ECHO MEAS - AO MAX PG: 10.5 MMHG
BH CV ECHO MEAS - AO MEAN PG: 6 MMHG
BH CV ECHO MEAS - AO ROOT DIAM: 3 CM
BH CV ECHO MEAS - AO V2 MAX: 162 CM/SEC
BH CV ECHO MEAS - AO V2 VTI: 28.7 CM
BH CV ECHO MEAS - EDV(CUBED): 59.3 ML
BH CV ECHO MEAS - EDV(MOD-SP4): 40.1 ML
BH CV ECHO MEAS - EF(MOD-SP4): 65.1 %
BH CV ECHO MEAS - ESV(CUBED): 9.3 ML
BH CV ECHO MEAS - ESV(MOD-SP4): 14 ML
BH CV ECHO MEAS - FS: 46.2 %
BH CV ECHO MEAS - IVS/LVPW: 1.13 CM
BH CV ECHO MEAS - IVSD: 1.7 CM
BH CV ECHO MEAS - LA DIMENSION: 4 CM
BH CV ECHO MEAS - LAT PEAK E' VEL: 6.6 CM/SEC
BH CV ECHO MEAS - LV DIASTOLIC VOL/BSA (35-75): 20 CM2
BH CV ECHO MEAS - LV MASS(C)D: 249 GRAMS
BH CV ECHO MEAS - LV SYSTOLIC VOL/BSA (12-30): 7 CM2
BH CV ECHO MEAS - LVIDD: 3.9 CM
BH CV ECHO MEAS - LVIDS: 2.1 CM
BH CV ECHO MEAS - LVOT AREA: 3.1 CM2
BH CV ECHO MEAS - LVOT DIAM: 2 CM
BH CV ECHO MEAS - LVPWD: 1.5 CM
BH CV ECHO MEAS - MED PEAK E' VEL: 5.2 CM/SEC
BH CV ECHO MEAS - MV A MAX VEL: 97.7 CM/SEC
BH CV ECHO MEAS - MV E MAX VEL: 66.8 CM/SEC
BH CV ECHO MEAS - MV E/A: 0.68
BH CV ECHO MEAS - PA ACC TIME: 0.12 SEC
BH CV ECHO MEAS - PA PR(ACCEL): 25 MMHG
BH CV ECHO MEAS - SI(MOD-SP4): 13 ML/M2
BH CV ECHO MEAS - SV(MOD-SP4): 26.1 ML
BH CV ECHO MEAS - TAPSE (>1.6): 1.75 CM
BH CV ECHO MEASUREMENTS AVERAGE E/E' RATIO: 11.32
LEFT ATRIUM VOLUME INDEX: 28.4 ML/M2
MAXIMAL PREDICTED HEART RATE: 147 BPM
STRESS TARGET HR: 125 BPM

## 2023-01-18 PROCEDURE — 93306 TTE W/DOPPLER COMPLETE: CPT

## 2023-01-18 PROCEDURE — 93306 TTE W/DOPPLER COMPLETE: CPT | Performed by: INTERNAL MEDICINE

## 2023-01-18 PROCEDURE — 93010 ELECTROCARDIOGRAM REPORT: CPT | Performed by: INTERNAL MEDICINE

## 2023-01-18 PROCEDURE — 93005 ELECTROCARDIOGRAM TRACING: CPT | Performed by: INTERNAL MEDICINE

## 2023-01-19 LAB
QT INTERVAL: 350 MS
QTC INTERVAL: 460 MS

## 2023-01-25 ENCOUNTER — HOSPITAL ENCOUNTER (OUTPATIENT)
Dept: CT IMAGING | Facility: HOSPITAL | Age: 73
Discharge: HOME | End: 2023-01-25
Admitting: NURSE PRACTITIONER
Payer: MEDICARE

## 2023-01-25 DIAGNOSIS — R91.8 MULTIPLE PULMONARY NODULES: ICD-10-CM

## 2023-01-25 PROCEDURE — 71250 CT THORAX DX C-: CPT | Performed by: RADIOLOGY

## 2023-01-25 PROCEDURE — 71250 CT THORAX DX C-: CPT

## 2023-01-26 ENCOUNTER — OFFICE VISIT (OUTPATIENT)
Dept: PULMONOLOGY | Facility: CLINIC | Age: 73
End: 2023-01-26
Payer: MEDICARE

## 2023-01-26 VITALS
HEIGHT: 67 IN | DIASTOLIC BLOOD PRESSURE: 86 MMHG | OXYGEN SATURATION: 96 % | BODY MASS INDEX: 29.03 KG/M2 | HEART RATE: 92 BPM | WEIGHT: 185 LBS | SYSTOLIC BLOOD PRESSURE: 142 MMHG | TEMPERATURE: 97.6 F

## 2023-01-26 DIAGNOSIS — R91.8 MULTIPLE PULMONARY NODULES: ICD-10-CM

## 2023-01-26 DIAGNOSIS — R06.02 SHORTNESS OF BREATH: Primary | ICD-10-CM

## 2023-01-26 DIAGNOSIS — G47.34 NOCTURNAL HYPOXIA: ICD-10-CM

## 2023-01-26 DIAGNOSIS — E66.3 OVERWEIGHT: ICD-10-CM

## 2023-01-26 PROCEDURE — 99214 OFFICE O/P EST MOD 30 MIN: CPT | Performed by: NURSE PRACTITIONER

## 2023-01-26 RX ORDER — HUMAN INSULIN 100 [USP'U]/ML
INJECTION, SUSPENSION SUBCUTANEOUS
COMMUNITY
Start: 2022-12-09

## 2023-01-26 RX ORDER — INSULIN GLARGINE 100 [IU]/ML
40 INJECTION, SOLUTION SUBCUTANEOUS DAILY
COMMUNITY

## 2023-01-26 RX ORDER — METOPROLOL SUCCINATE 50 MG/1
1 TABLET, EXTENDED RELEASE ORAL DAILY
COMMUNITY
Start: 2022-08-17

## 2023-01-26 RX ORDER — SEMAGLUTIDE 1.34 MG/ML
INJECTION, SOLUTION SUBCUTANEOUS
COMMUNITY
Start: 2022-12-09

## 2023-01-26 NOTE — PROGRESS NOTES
"Chief Complaint  Shortness of Breath (Unable to tolerate inhaler, c/o dizziness. )    Subjective        Charito MANDI Freeman presents to [facility] PULMONARY & CRITICAL CARE MEDICINE  History of Present Illness     Ms. Freeman is a 72 year old female with a medical history significant for anxiety, depression, hypothyroidism, diabetes, GERD, and hypertension.    She presents today for follow up on shortness of breath.  She reports that her breathing is getting worse.  She reports that the albtuerol inhaler that she was prescribed cause her to have shakiness and dizziness.  She was unable to complete PFT in the past.  She is compliant with use of supplemental oxygen at night.       Objective   Vital Signs:  /86 (BP Location: Left arm, Patient Position: Sitting)   Pulse 92   Temp 97.6 °F (36.4 °C)   Ht 170.2 cm (67\")   Wt 83.9 kg (185 lb)   SpO2 96%   BMI 28.98 kg/m²   Estimated body mass index is 28.98 kg/m² as calculated from the following:    Height as of this encounter: 170.2 cm (67\").    Weight as of this encounter: 83.9 kg (185 lb).       BMI is >= 25 and <30. (Overweight) The following options were offered after discussion;: exercise counseling/recommendations and nutrition counseling/recommendations      Physical Exam     GENERAL APPEARANCE: Well developed, well nourished, alert and cooperative, and appears to be in no acute distress.    HEAD: normocephalic. Atraumatic.    EYES: PERRL, EOMI. Vision is grossly intact.    THROAT: Oral cavity and pharynx normal. No inflammation, swelling, exudate, or lesions.     NECK: Neck supple.  No thyromegaly.    CARDIAC: Normal S1 and S2. No S3, S4 or murmurs. Rhythm is regular.     RESPIRATORY:Bilateral air entry positive. Bilateral diminished breath sounds. No wheezing, crackles or rhonchi noted.    GI: Positive bowel sounds. Soft, nondistended, nontender.     MUSCULOSKELETAL: No significant deformity or joint abnormality. No edema. Peripheral pulses " intact. No varicosities.    NEUROLOGICAL: Strength and sensation symmetric and intact throughout.     PSYCHIATRIC: The mental examination revealed the patient was oriented to person, place, and time.     Result Review :  The following data was reviewed by: MERCEDES Delatorre on 01/26/2023:  Common labs    Common Labs 10/12/22 10/12/22    0946 0946   Glucose  120 (A)   BUN  17   Creatinine  1.01 (A)   Sodium  135 (A)   Potassium  4.5   Chloride  97 (A)   Calcium  10.0   Albumin  4.53   Total Bilirubin  0.3   Alkaline Phosphatase  93   AST (SGOT)  13   ALT (SGPT)  10   WBC 14.53 (A)    Hemoglobin 12.4    Hematocrit 38.1    Platelets 277    (A) Abnormal value                         Assessment and Plan   Diagnoses and all orders for this visit:    1. Shortness of breath (Primary)    2. Multiple pulmonary nodules    3. Nocturnal hypoxia    4. Overweight    Other orders  -     ipratropium (ATROVENT) 0.02 % nebulizer solution; Take 2.5 mL by nebulization 4 (Four) Times a Day As Needed for Wheezing or Shortness of Air.  Dispense: 12.5 mL; Refill: 11         CT imaging showed stability of bilateral parenchymal nodule.  We will repeat CT Chest in 6 months.    She did not tolerate albuterol or anoro.  Will start her on Atrovent as needed.    She is complaint with use of supplemental oxygen at night.     Ordered echo to evaluate other sources of shortness of breath.      Echo was reviewed and discussed in detail.    Results for orders placed during the hospital encounter of 01/18/23    Adult Transthoracic Echo Complete W/ Cont if Necessary Per Protocol    Interpretation Summary  •  Normal left ventricular cavity size noted.  •  Left ventricular wall thickness is consistent with moderate concentric hypertrophy.  •  Left ventricular systolic function is normal. Left ventricular ejection fraction appears to be greater than 70%.  •  Left ventricular diastolic function is consistent with (grade I) impaired relaxation.  •   Aortic valve is trileaflet valve, no evidence of aortic stenosis or aortic regurgitation detected  •  Mitral valve echo is normal trace mitral regurgitation is noted.  No evidence of mitral stenosis.  •  Tricuspid valve echo is normal no tricuspid stenosis or regurgitation detected.  •  No pericardial effusion detected.        Follow Up   Return in about 8 weeks (around 3/23/2023).  Patient was given instructions and counseling regarding her condition or for health maintenance advice. Please see specific information pulled into the AVS if appropriate.

## 2023-11-01 ENCOUNTER — SPECIALTY PHARMACY (OUTPATIENT)
Dept: PHARMACY | Facility: HOSPITAL | Age: 73
End: 2023-11-01
Payer: MEDICARE

## 2023-11-01 ENCOUNTER — OFFICE VISIT (OUTPATIENT)
Dept: ENDOCRINOLOGY | Facility: CLINIC | Age: 73
End: 2023-11-01
Payer: MEDICARE

## 2023-11-01 VITALS
OXYGEN SATURATION: 97 % | WEIGHT: 174.2 LBS | SYSTOLIC BLOOD PRESSURE: 116 MMHG | HEART RATE: 92 BPM | BODY MASS INDEX: 27.34 KG/M2 | DIASTOLIC BLOOD PRESSURE: 70 MMHG | HEIGHT: 67 IN

## 2023-11-01 DIAGNOSIS — M85.80 OSTEOPENIA WITH HIGH RISK OF FRACTURE: Primary | ICD-10-CM

## 2023-11-01 PROBLEM — R53.1 WEAKNESS: Status: ACTIVE | Noted: 2023-11-01

## 2023-11-01 PROBLEM — T14.8XXA FRACTURE: Status: ACTIVE | Noted: 2023-11-01

## 2023-11-01 PROCEDURE — 3074F SYST BP LT 130 MM HG: CPT | Performed by: NURSE PRACTITIONER

## 2023-11-01 PROCEDURE — 1160F RVW MEDS BY RX/DR IN RCRD: CPT | Performed by: NURSE PRACTITIONER

## 2023-11-01 PROCEDURE — 3078F DIAST BP <80 MM HG: CPT | Performed by: NURSE PRACTITIONER

## 2023-11-01 PROCEDURE — 1159F MED LIST DOCD IN RCRD: CPT | Performed by: NURSE PRACTITIONER

## 2023-11-01 PROCEDURE — 99213 OFFICE O/P EST LOW 20 MIN: CPT | Performed by: NURSE PRACTITIONER

## 2023-11-01 RX ORDER — HYDROCODONE BITARTRATE AND ACETAMINOPHEN 10; 325 MG/1; MG/1
1 TABLET ORAL EVERY 12 HOURS PRN
COMMUNITY

## 2023-11-01 RX ORDER — ERGOCALCIFEROL 1.25 MG/1
50000 CAPSULE ORAL WEEKLY
COMMUNITY

## 2023-11-01 RX ORDER — PANTOPRAZOLE SODIUM 40 MG/1
40 TABLET, DELAYED RELEASE ORAL DAILY
COMMUNITY

## 2023-11-01 RX ORDER — LIRAGLUTIDE 6 MG/ML
0.6 INJECTION SUBCUTANEOUS DAILY
COMMUNITY

## 2023-11-01 RX ORDER — ALENDRONATE SODIUM 70 MG/1
70 TABLET ORAL
COMMUNITY

## 2023-11-01 NOTE — PROGRESS NOTES
Chief Complaint   Patient presents with    Diabetes        Referring Provider  No ref. provider found     HPI   Charito Freeman is a 73 y.o. female had concerns including Diabetes.    Seen as a new patient.  Osteoporosis.    Osteopenia:  She has had 6 or 7 compression fractures in her back without trauma.  She has been on Fosamax for a couple of weeks.    She had Kyoplasty on her back on May 15, 2023.  She fell early in the morning and was not found until the following day.  She was found laying in the floor and are unsure how she ended up in the floor.  DXA Scan 07/2023:  FINDINGS:   Bone densitometry calculations of the lumbar spine and left femoral neck  were obtained.   Left forearm bone mineral density results as follows:   UD BMD is 0.384 g/sq cm.   T-score is -1.0. Z-score is 0.7   MID BMD is 0.513 g/sq cm.   T-score is -1.7. Z-score is 0.7   1/3 BMD is 0.593 g/sq cm.   T-score is -1.7. Z-score is 0.7   Average BMD is 0.493 g/sq cm.    T-score is -1.6 Z-score is 0.7   The results correspond to osteopenia.   Left femoral neck BMD is 0.681 g/sq cm.    T-score is -1.5.   Z score is 0.5.   Left femoral total hip BMD is 0.774 g/sq cm.   T-score is -1.4.   Z-score is 0.3   FRAX    WHO Fracture Risk Assessment Tool   10 year major osteoporotic fracture risk: Not reported due to history of  prior hip or vertebral fracture.   Impression:  Low bone mineral density corresponds to osteopenia within the areas evaluated.    She has been noting increased dizziness since starting on the Fosamax and has been more unsteady on her feet since as well.    Past Medical History:   Diagnosis Date    Anxiety     Depression     Disease of thyroid gland     DM (diabetes mellitus screen)     GERD (gastroesophageal reflux disease)     Hypertension      Past Surgical History:   Procedure Laterality Date    BRONCHOSCOPY N/A 10/12/2022    Procedure: BRONCHOSCOPY WITH ENDOBRONCHIAL ULTRASOUND;  Surgeon: Ziggy Butler MD;  [facility]: [facility]  OR;  Service: Pulmonary;  Laterality: N/A;    COLONOSCOPY      ENDOSCOPY      HYSTERECTOMY      REPLACEMENT TOTAL KNEE        Family History   Family history unknown: Yes      Social History     Socioeconomic History    Marital status:    Tobacco Use    Smoking status: Never    Smokeless tobacco: Never   Vaping Use    Vaping Use: Never used   Substance and Sexual Activity    Alcohol use: No    Drug use: No    Sexual activity: Defer      Allergies   Allergen Reactions    Citalopram     Dasatinib     Imatinib     Lisinopril       Current Outpatient Medications on File Prior to Visit   Medication Sig Dispense Refill    albuterol sulfate  (90 Base) MCG/ACT inhaler Inhale 2 puffs Every 4 (Four) Hours As Needed for Wheezing. (Patient not taking: Reported on 11/1/2023) 18 g 11    amLODIPine (NORVASC) 10 MG tablet Take 1 tablet by mouth Daily.      BOSULIF 100 MG chemo tablet       Fluticasone Furoate (Arnuity Ellipta) 100 MCG/ACT aerosol powder  Inhale 1 puff Daily. (Patient not taking: Reported on 11/1/2023) 30 each 5    Insulin Glargine (Lantus SoloStar) 100 UNIT/ML injection pen Inject 20 Units under the skin into the appropriate area as directed 2 (Two) Times a Day.      ipratropium (ATROVENT) 0.02 % nebulizer solution Take 2.5 mL by nebulization 4 (Four) Times a Day As Needed for Wheezing or Shortness of Air. (Patient not taking: Reported on 11/1/2023) 12.5 mL 11    levothyroxine (SYNTHROID, LEVOTHROID) 25 MCG tablet Daily.      metoprolol succinate XL (TOPROL-XL) 25 MG 24 hr tablet Take 1 tablet by mouth Daily.      sertraline (ZOLOFT) 50 MG tablet       valsartan (DIOVAN) 40 MG tablet Take 1 tablet by mouth Daily.       No current facility-administered medications on file prior to visit.        Review of Systems   Constitutional:  Positive for fatigue.   Eyes:  Positive for blurred vision.   Musculoskeletal:  Positive for arthralgias, back pain and myalgias.   Neurological:  Positive for dizziness and  "weakness.   Psychiatric/Behavioral:  Positive for sleep disturbance.    All other systems reviewed and are negative.    /70 (BP Location: Left arm, Patient Position: Sitting, Cuff Size: Adult)   Pulse 92   Ht 170.2 cm (67\")   Wt 79 kg (174 lb 3.2 oz)   SpO2 97%   BMI 27.28 kg/m²      Physical Exam  Vitals reviewed.   Constitutional:       Appearance: Normal appearance.      Comments: Unsteady gait.   Eyes:      Extraocular Movements: Extraocular movements intact.   Cardiovascular:      Rate and Rhythm: Tachycardia present.   Pulmonary:      Effort: Pulmonary effort is normal.   Neurological:      General: No focal deficit present.      Mental Status: She is alert and oriented to person, place, and time.   Psychiatric:         Mood and Affect: Mood normal.         Behavior: Behavior normal.         Thought Content: Thought content normal.         Judgment: Judgment normal.       CMP:  Lab Results   Component Value Date    BUN 17 10/12/2022    CREATININE 1.01 (H) 10/12/2022    EGFRIFNONA 70 03/26/2020    BCR 16.8 10/12/2022     (L) 10/12/2022    K 4.5 10/12/2022    CO2 25.5 10/12/2022    CALCIUM 10.0 10/12/2022    ALBUMIN 4.53 10/12/2022    LABIL2 1.6 06/27/2015    BILITOT 0.3 10/12/2022    ALKPHOS 93 10/12/2022    AST 13 10/12/2022    ALT 10 10/12/2022     Lipid Panel:  No results found for: \"CHOL\", \"TRIG\", \"HDL\", \"VLDL\", \"LDL\"    HbA1c:     Glucose:  Lab Results   Component Value Date    POCGLU 107 10/12/2022     Microalbumin:  No results found for: \"MALBCRERATIO\"  TSH:  Lab Results   Component Value Date    TSH 2.910 05/17/2023       Assessment and Plan    Diagnoses and all orders for this visit:    1. Osteopenia with high risk of fracture (Primary)  Assessment & Plan:  Discussed the various treatment options with patient and family. Spoke with Dr Erickson as well.  With her DXA report and her fracture history, she would benefit from treatment with Prolia or Forteo.  Will work with patient's insurance " company for coverage of this.  Advised to stop Fosamax to resolve increased dizziness and prevent falls and future related fractures.  Follow-up in 3 months.      Other orders  -     SCANNED - LABS         Return in about 3 months (around 2/1/2024). The patient was instructed to contact the clinic with any interval questions or concerns.        This document has been electronically signed by MERCEDES Tejeda  November 8, 2023 08:32 EST   Endocrinology    Please note that portions of this document were completed with a voice recognition program. Efforts were made to edit the dictations, but occasionally words are mis-transcribed.

## 2023-11-01 NOTE — PROGRESS NOTES
Specialty Pharmacy Patient Management Program  Endocrinology Initial Assessment       Charito Freeman is a 73 y.o. female with Type 2 Diabetes seen by an Endocrinology provider and enrolled in the Endocrinology Patient Management program offered by [facility].  An initial outreach was conducted, including assessment of therapy appropriateness and specialty medication education for Victoza and Insulin therapy. The patient was introduced to services offered by [facility], including: regular assessments, refill coordination, curbside pick-up or mail order delivery options, prior authorization maintenance, and financial assistance programs as applicable. The patient was also provided with contact information for the pharmacy team.     Patient is currently taking Victoza 0.6 mg daily and Lantus 20 units BID.    Patient checks BG daily with readings 150-200. Patient denies low BG <70.     Patient reports she is here to discuss recent compression fractures with the endocrinology provider (6 compression fractures since April). Daughter reported she had a negative reaction following anasthesia in May for which she was out of it for 3 days following. Reports they even called EMS and she was sent back to the hospital and given haldol.       Patient denies personal/family history of thyroid cancer, denies history of pancreatitis, and denies issues with recurrent UTI/yeast infections (reports 1 UTI in June that resolved).    In the past, the patient has tried:     Drug Dose Reason for Discontinuation Notes   Novolin 70/30      Ozempic      Metformin      Trulicity                          Insurance Coverage & Financial Support  Leukemia medication comes from [employer] assistance programs.     Relevant Past Medical History and Comorbidities  Relevant medical history and concomitant health conditions were discussed with the patient. The patient's chart has been reviewed for relevant  "past medical history and comorbid health conditions and updated as necessary.   Past Medical History:   Diagnosis Date    Anxiety     Depression     Disease of thyroid gland     DM (diabetes mellitus screen)     GERD (gastroesophageal reflux disease)     Hypertension      Social History     Socioeconomic History    Marital status:    Tobacco Use    Smoking status: Never    Smokeless tobacco: Never   Vaping Use    Vaping Use: Never used   Substance and Sexual Activity    Alcohol use: No    Drug use: No    Sexual activity: Defer       Problem list reviewed by Vicky Nichole RPH on 11/1/2023 at  3:12 PM  Problem list reviewed by Vicky Nichole RPH on 11/1/2023 at  4:44 PM    Allergies  Known allergies and reactions were discussed with the patient. The patient's chart has been reviewed for  allergy information and updated as necessary.   Allergies   Allergen Reactions    Citalopram     Dasatinib     Imatinib     Lisinopril        Allergies reviewed by Vicky Nichole RPH on 11/1/2023 at  3:12 PM    Relevant Laboratory Values    No results found for: \"HGBA1C\"  Lab Results   Component Value Date    GLUCOSE 120 (H) 10/12/2022    CALCIUM 10.0 10/12/2022     (L) 10/12/2022    K 4.5 10/12/2022    CO2 25.5 10/12/2022    CL 97 (L) 10/12/2022    BUN 17 10/12/2022    CREATININE 1.01 (H) 10/12/2022    EGFRIFNONA 70 03/26/2020    BCR 16.8 10/12/2022    ANIONGAP 12.5 10/12/2022     No results found for: \"CHOL\", \"CHLPL\", \"TRIG\", \"HDL\", \"LDL\", \"LDLDIRECT\"      Current Medication List  This medication list has been reviewed with the patient and evaluated for any interactions or necessary modifications/recommendations, and updated to include all prescription medications, OTC medications, and supplements the patient is currently taking.  This list reflects what is contained in the patient's profile, which has also been marked as reviewed to communicate to other providers it is the most up to date version of the " patient's current medication therapy.     Current Outpatient Medications:     alendronate (FOSAMAX) 70 MG tablet, Take 1 tablet by mouth Every 7 (Seven) Days., Disp: , Rfl:     amLODIPine (NORVASC) 10 MG tablet, Take 1 tablet by mouth Daily., Disp: , Rfl:     BOSULIF 100 MG chemo tablet, , Disp: , Rfl:     HYDROcodone-acetaminophen (NORCO)  MG per tablet, Take 1 tablet by mouth Every 12 (Twelve) Hours As Needed for Moderate Pain., Disp: , Rfl:     Insulin Glargine (Lantus SoloStar) 100 UNIT/ML injection pen, Inject 20 Units under the skin into the appropriate area as directed 2 (Two) Times a Day., Disp: , Rfl:     levothyroxine (SYNTHROID, LEVOTHROID) 25 MCG tablet, Daily., Disp: , Rfl:     Liraglutide (Victoza) 18 MG/3ML solution pen-injector injection, Inject 0.6 mg under the skin into the appropriate area as directed Daily., Disp: , Rfl:     metoprolol succinate XL (TOPROL-XL) 25 MG 24 hr tablet, Take 1 tablet by mouth Daily., Disp: , Rfl:     pantoprazole (PROTONIX) 40 MG EC tablet, Take 1 tablet by mouth Daily., Disp: , Rfl:     sertraline (ZOLOFT) 50 MG tablet, , Disp: , Rfl:     valsartan (DIOVAN) 40 MG tablet, Take 1 tablet by mouth Daily., Disp: , Rfl:     vitamin D (ERGOCALCIFEROL) 1.25 MG (04683 UT) capsule capsule, Take 1 capsule by mouth 1 (One) Time Per Week., Disp: , Rfl:     albuterol sulfate  (90 Base) MCG/ACT inhaler, Inhale 2 puffs Every 4 (Four) Hours As Needed for Wheezing. (Patient not taking: Reported on 11/1/2023), Disp: 18 g, Rfl: 11    Fluticasone Furoate (Arnuity Ellipta) 100 MCG/ACT aerosol powder , Inhale 1 puff Daily. (Patient not taking: Reported on 11/1/2023), Disp: 30 each, Rfl: 5    ipratropium (ATROVENT) 0.02 % nebulizer solution, Take 2.5 mL by nebulization 4 (Four) Times a Day As Needed for Wheezing or Shortness of Air. (Patient not taking: Reported on 11/1/2023), Disp: 12.5 mL, Rfl: 11    Medicines reviewed by Vicky Nichole RP on 11/1/2023 at  3:17 PM  Medicines  reviewed by Vicky Nichole [facility] on 11/1/2023 at  3:19 PM    Drug Interactions  No significant drug-drug interactions with diabetes medications expected according to literature.    Other interactions:  Plasma concentrations and pharmacologic effects of Bosutinib may be decreased by co-administration of proton pump inhibitors. Reductions in therapeutic efficacy of Bosutinib may occur. Patient should continue to follow with oncology for further management.    Recommended Medications Assessment  Aspirin -  Not Taking Currently  Statin -  Not Taking Currently  ACEi/ARB - Currently Taking       Adherence and Self-Administration  Adherence related to the patient's specialty therapy was discussed with the patient. The Adherence segment of this outreach has been reviewed and updated.     Barriers to Patient Adherence and/or Self-Administration: Patient appears confused   Methods for Supporting Patient Adherence and/or Self-Administration: notify provider    Vaccination Status:   COVID 19: Yes 3  Influenza: Not this year  Pneumococcal: last year  Hep B: unsure    Smoker? No    Goals of Therapy  Goals related to the patient's specialty therapy were discussed with the patient. The Patient Goals segment of this outreach has been reviewed and updated.    Goals Addressed Today        HEMOGLOBIN A1C < 7      Specialty Pharmacy General Goal      Minimize Hypoglycemia              Reassessment Plan & Follow-Up  Medication Therapy Changes:  None discussed with patient   Related Plans, Therapy Recommendations or Therapy Problems to Be Addressed: Patient denies concerns with diabetes at this time. Will notify provider of concerns with compression fractures.    Patient denies issues with affordability or adherence at this time.       Patient does not wish to use [facility] at this time due to:   Wishes to use usual pharmacy    Attestation  I attest the patient was actively involved in and has agreed to the above plan of  care. If the prescribed therapy is at any point deemed not appropriate based on the current or future assessments, a consultation will be initiated with the patient's specialty care provider to determine the best course of action. The revised plan of therapy will be documented along with any required assessments and/or additional patient education provided.    Thank you,    Vicky Nichole, PharmD  Community PGY1 Pharmacy Resident  [facility]

## 2023-11-07 PROBLEM — M85.89 OSTEOPENIA OF MULTIPLE SITES: Status: ACTIVE | Noted: 2023-11-07

## 2023-11-07 NOTE — ASSESSMENT & PLAN NOTE
Discussed the various treatment options with patient and family. Spoke with Dr Erickson as well.  With her DXA report and her fracture history, she would benefit from treatment with Prolia or Forteo.  Will work with patient's insurance company for coverage of this.  Advised to stop Fosamax to resolve increased dizziness and prevent falls and future related fractures.  Follow-up in 3 months.

## 2023-11-08 PROBLEM — M85.80 OSTEOPENIA WITH HIGH RISK OF FRACTURE: Status: ACTIVE | Noted: 2023-11-07

## 2023-11-08 RX ORDER — TERIPARATIDE 250 UG/ML
20 INJECTION, SOLUTION SUBCUTANEOUS DAILY
Qty: 2.4 ML | Refills: 2 | Status: SHIPPED | OUTPATIENT
Start: 2023-11-08

## 2023-11-21 DIAGNOSIS — M85.80 OSTEOPENIA WITH HIGH RISK OF FRACTURE: Primary | ICD-10-CM

## 2023-12-07 ENCOUNTER — INFUSION (OUTPATIENT)
Dept: ONCOLOGY | Facility: HOSPITAL | Age: 73
End: 2023-12-07
Payer: MEDICARE

## 2023-12-07 VITALS
WEIGHT: 177.6 LBS | OXYGEN SATURATION: 98 % | DIASTOLIC BLOOD PRESSURE: 80 MMHG | TEMPERATURE: 96.6 F | HEART RATE: 82 BPM | BODY MASS INDEX: 27.82 KG/M2 | SYSTOLIC BLOOD PRESSURE: 125 MMHG | RESPIRATION RATE: 18 BRPM

## 2023-12-07 DIAGNOSIS — M85.80 OSTEOPENIA WITH HIGH RISK OF FRACTURE: Primary | ICD-10-CM

## 2023-12-07 PROCEDURE — 25010000002 DENOSUMAB 60 MG/ML SOLUTION PREFILLED SYRINGE: Performed by: NURSE PRACTITIONER

## 2023-12-07 PROCEDURE — 96372 THER/PROPH/DIAG INJ SC/IM: CPT

## 2023-12-07 RX ADMIN — DENOSUMAB 60 MG: 60 INJECTION SUBCUTANEOUS at 10:08

## 2024-02-02 ENCOUNTER — OFFICE VISIT (OUTPATIENT)
Dept: PULMONOLOGY | Facility: CLINIC | Age: 74
End: 2024-02-02
Payer: MEDICARE

## 2024-02-02 VITALS
TEMPERATURE: 97.7 F | DIASTOLIC BLOOD PRESSURE: 78 MMHG | BODY MASS INDEX: 27.78 KG/M2 | WEIGHT: 177 LBS | SYSTOLIC BLOOD PRESSURE: 118 MMHG | OXYGEN SATURATION: 96 % | HEIGHT: 67 IN | HEART RATE: 104 BPM

## 2024-02-02 DIAGNOSIS — E66.3 OVERWEIGHT: ICD-10-CM

## 2024-02-02 DIAGNOSIS — R91.8 MULTIPLE PULMONARY NODULES: ICD-10-CM

## 2024-02-02 DIAGNOSIS — R06.02 SHORTNESS OF BREATH: Primary | ICD-10-CM

## 2024-02-02 NOTE — PROGRESS NOTES
"Chief Complaint  Shortness of Breath    Subjective        Charito MANDI Freeman presents to [facility] PULMONARY & CRITICAL CARE MEDICINE  History of Present Illness    Ms. Freeman is a 73 year old female with a medical history significant for anxiety, depression, diabetes, GERD, and hypertension.    She presents today for follow up on shortness of breath.  She states that she does still have some shortness of breath with exertion.  She is not currently using any inhalers.  She states that she is no longer using her oxygen.  She was in the hospital for 3 months due to a broken back and was told during her stay that she did not need the oxygen.      Objective   Vital Signs:  /78   Pulse 104   Temp 97.7 °F (36.5 °C)   Ht 170.2 cm (67.01\")   Wt 80.3 kg (177 lb)   SpO2 96%   BMI 27.72 kg/m²   Estimated body mass index is 27.72 kg/m² as calculated from the following:    Height as of this encounter: 170.2 cm (67.01\").    Weight as of this encounter: 80.3 kg (177 lb).       BMI is >= 25 and <30. (Overweight) The following options were offered after discussion;: exercise counseling/recommendations and nutrition counseling/recommendations      Physical Exam     GENERAL APPEARANCE: Well developed, well nourished, alert and cooperative, and appears to be in no acute distress.    HEAD: normocephalic. Atraumatic.    EYES: PERRL, EOMI. Vision is grossly intact.    THROAT: Oral cavity and pharynx normal. No inflammation, swelling, exudate, or lesions.     NECK: Neck supple.  No thyromegaly.    CARDIAC: Normal S1 and S2. No S3, S4 or murmurs. Rhythm is regular.     RESPIRATORY:Bilateral air entry positive. Bilateral diminished breath sounds. No wheezing, crackles or rhonchi noted.    GI: Positive bowel sounds. Soft, nondistended, nontender.     MUSCULOSKELETAL: No significant deformity or joint abnormality. No edema. Peripheral pulses intact. No varicosities.    NEUROLOGICAL: Strength and sensation symmetric and " intact throughout.     PSYCHIATRIC: The mental examination revealed the patient was oriented to person, place, and time.     Result Review :    The following data was reviewed by: MERCEDES Delatorre on 02/02/2024:                 Assessment and Plan     Diagnoses and all orders for this visit:    1. Shortness of breath (Primary)    2. Multiple pulmonary nodules  -     CT Chest Without Contrast; Future    3. Overweight          Previous CT imaging showed stability of pulmonary nodules.  We will plan to repeat CT chest.    She is not currently taking any inhaler.  She states that she does not feel that she needs them at this itme.    She is no longer using supplemental oxygen.  She was told in the hospital that she didn't need it.      Follow Up     Return in about 1 year (around 2/2/2025).  Patient was given instructions and counseling regarding her condition or for health maintenance advice. Please see specific information pulled into the AVS if appropriate.

## 2024-02-12 ENCOUNTER — OFFICE VISIT (OUTPATIENT)
Dept: ENDOCRINOLOGY | Facility: CLINIC | Age: 74
End: 2024-02-12
Payer: MEDICARE

## 2024-02-12 ENCOUNTER — SPECIALTY PHARMACY (OUTPATIENT)
Dept: PHARMACY | Facility: HOSPITAL | Age: 74
End: 2024-02-12
Payer: MEDICARE

## 2024-02-12 VITALS
OXYGEN SATURATION: 97 % | BODY MASS INDEX: 27.84 KG/M2 | HEART RATE: 91 BPM | SYSTOLIC BLOOD PRESSURE: 138 MMHG | DIASTOLIC BLOOD PRESSURE: 79 MMHG | WEIGHT: 177.4 LBS | HEIGHT: 67 IN

## 2024-02-12 DIAGNOSIS — Z79.4 TYPE 2 DIABETES MELLITUS WITH HYPERGLYCEMIA, WITH LONG-TERM CURRENT USE OF INSULIN: ICD-10-CM

## 2024-02-12 DIAGNOSIS — M85.80 OSTEOPENIA WITH HIGH RISK OF FRACTURE: Primary | ICD-10-CM

## 2024-02-12 DIAGNOSIS — E11.65 TYPE 2 DIABETES MELLITUS WITH HYPERGLYCEMIA, WITH LONG-TERM CURRENT USE OF INSULIN: ICD-10-CM

## 2024-02-12 PROCEDURE — 3075F SYST BP GE 130 - 139MM HG: CPT | Performed by: NURSE PRACTITIONER

## 2024-02-12 PROCEDURE — 1160F RVW MEDS BY RX/DR IN RCRD: CPT | Performed by: NURSE PRACTITIONER

## 2024-02-12 PROCEDURE — 1159F MED LIST DOCD IN RCRD: CPT | Performed by: NURSE PRACTITIONER

## 2024-02-12 PROCEDURE — 3078F DIAST BP <80 MM HG: CPT | Performed by: NURSE PRACTITIONER

## 2024-02-12 PROCEDURE — 99213 OFFICE O/P EST LOW 20 MIN: CPT | Performed by: NURSE PRACTITIONER

## 2024-02-12 RX ORDER — TRAZODONE HYDROCHLORIDE 100 MG/1
100 TABLET ORAL NIGHTLY
COMMUNITY
Start: 2024-01-02

## 2024-02-16 ENCOUNTER — HOSPITAL ENCOUNTER (OUTPATIENT)
Dept: CT IMAGING | Facility: HOSPITAL | Age: 74
Discharge: HOME | End: 2024-02-16
Payer: MEDICARE

## 2024-02-16 DIAGNOSIS — R91.8 MULTIPLE PULMONARY NODULES: ICD-10-CM

## 2024-02-16 PROCEDURE — 71250 CT THORAX DX C-: CPT

## 2024-02-16 PROCEDURE — 71250 CT THORAX DX C-: CPT | Performed by: RADIOLOGY

## 2024-02-21 ENCOUNTER — DOCUMENTATION (OUTPATIENT)
Dept: PULMONOLOGY | Facility: CLINIC | Age: 74
End: 2024-02-21
Payer: MEDICARE

## 2024-02-21 NOTE — PROGRESS NOTES
Notified the patient of her CT chest results. Pulmonary nodules remain stable.  We will plan to repeat CT Chest in one year.

## 2024-03-18 ENCOUNTER — OFFICE VISIT (OUTPATIENT)
Dept: PSYCHIATRY | Facility: CLINIC | Age: 74
End: 2024-03-18
Payer: MEDICARE

## 2024-03-18 DIAGNOSIS — F34.1 DYSTHYMIA: Primary | ICD-10-CM

## 2024-03-18 DIAGNOSIS — G47.00 INSOMNIA, UNSPECIFIED TYPE: ICD-10-CM

## 2024-03-18 DIAGNOSIS — F41.1 GENERALIZED ANXIETY DISORDER: ICD-10-CM

## 2024-03-18 PROCEDURE — 90791 PSYCH DIAGNOSTIC EVALUATION: CPT | Performed by: SOCIAL WORKER

## 2024-03-18 PROCEDURE — 1159F MED LIST DOCD IN RCRD: CPT | Performed by: SOCIAL WORKER

## 2024-03-18 PROCEDURE — 1160F RVW MEDS BY RX/DR IN RCRD: CPT | Performed by: SOCIAL WORKER

## 2024-03-18 NOTE — PROGRESS NOTES
"IDENTIFYING INFORMATION:   The patient, Charito Freeman, is a 73 y.o. female,  54 years, mother of 2 grown children ages 50 and 48, high school graduate, who is here today for initial appointment starting at 1:30 pm. and ending at 2:45 pm.. Patient is being seen at [facility] [address], [place] [address].    CHIEF COMPLIANT: \" My back is broken and I am in a lot of pain\"    HPI: Patient comes in for her initial therapy appointment early stating that she is in a lot of pain.  Patient reports that last May she was carrying a gallon of water and heard several pops.  Patient reports that is when she first started having intense back pain and was told that she broke her back in several places.  Patient reports that she did have surgery but had a bad reaction to the medication.  Patient reports that in July she continued to have a lot of pain and that her doctor started her on pain medications in September 2023.  Patient reports that she has continued to break more bones in her back and that last Sunday she broke another bone again.  Patient reports that her daughter called an ambulance without her permission and the police came.  Patient reports that she did go to the emergency room and got a shot and some lidocaine patches which did help.  Patient reports she is very angry at her daughter and cannot forgive her daughter for interfering in her medical treatment.  Patient reports that her daughter tells the doctor things such as not prescribing her pain medicine.  Patient reports that she only sleeps about 5 to 6 hours a night and is continuing to wake up with pain.  Patient reports that her weight has not changed but that she just wants to isolate and lay in the bed because that is less painful then.  Patient reports she cannot do anything and cannot concentrate on anything.  Patient reports that she has not driven a car in 4 to or 5 years and that they do [employer]  where her son has now moved in with " her to help her and her .  Patient reports her  has knee pain and is looking at getting ongoing treatment with him having difficulty getting around now.  Patient reports that she did lose her oldest sister 2 weeks ago at the age of 82.  Patient reports that her depression level is at a 4 to a 5 and her anxiety level is 7 to an 8 but that her anxiety is related to her pain.  Patient denies any hallucinations or delusions or any traumas.  Patient denies having any thoughts to harm herself or others at present time.    PAST PSYCH HISTORY: Patient reports that she was hospitalized to the psychiatric [facility] unit over 10 years ago.  Related to issues of dealing with her family.  Patient was seen on an outpatient basis in the [facility] from  through  with seeing the nurse practitioner, Leslie Kwok, Navya Andersen, and 1 therapy a session with Pia Quinn, for depression and anxiety.    SUBSTANCE ABUSE HISTORY: Denies any substance abuse history until being on pain medication for her back taken as prescribed.    FAMILY HISTORY: Patient was born and raised in [place] with her parents being  and having 9 siblings.  Patient reports that most of her parents and siblings  with heart problems.  Patient denies either her parents or other siblings having depression and anxiety, no substance abuse issues or any psychiatric treatment.    MEDICAL HISTORY: Patient is presently being treated for her chronic back pain having multiple spinal compression fractures, osteoporosis, chronic back pain, and diabetes type 2.  Patient is 5 feet 7 inches tall weighing 175 pounds.  Patient reports having 2 knee replacements in , gallstone surgery in  and a hysterectomy in .    CURRENT MEDICATIONS:  Current Outpatient Medications   Medication Sig Dispense Refill    albuterol sulfate  (90 Base) MCG/ACT inhaler Inhale 2 puffs Every 4 (Four) Hours As Needed for Wheezing. (Patient  not taking: Reported on 2/12/2024) 18 g 11    amLODIPine (NORVASC) 10 MG tablet Take 1 tablet by mouth Daily.      BOSULIF 100 MG chemo tablet Take 2 tablets by mouth Daily.      Fluticasone Furoate (Arnuity Ellipta) 100 MCG/ACT aerosol powder  Inhale 1 puff Daily. (Patient not taking: Reported on 2/12/2024) 30 each 5    HYDROcodone-acetaminophen (NORCO)  MG per tablet Take 1 tablet by mouth Every 12 (Twelve) Hours As Needed for Moderate Pain.      Insulin Glargine (Lantus SoloStar) 100 UNIT/ML injection pen Inject 20 Units under the skin into the appropriate area as directed 2 (Two) Times a Day.      ipratropium (ATROVENT) 0.02 % nebulizer solution Take 2.5 mL by nebulization 4 (Four) Times a Day As Needed for Wheezing or Shortness of Air. (Patient not taking: Reported on 2/12/2024) 12.5 mL 11    levothyroxine (SYNTHROID, LEVOTHROID) 25 MCG tablet Daily.      Liraglutide (Victoza) 18 MG/3ML solution pen-injector injection Inject 1.2 mg under the skin into the appropriate area as directed Daily.      metoprolol succinate XL (TOPROL-XL) 25 MG 24 hr tablet Take 1 tablet by mouth Daily.      pantoprazole (PROTONIX) 40 MG EC tablet Take 1 tablet by mouth Daily.      Teriparatide, Recombinant, (Forteo) 600 MCG/2.4ML injection Inject 0.08 mL under the skin into the appropriate area as directed Daily. (Patient not taking: Reported on 2/12/2024) 2.4 mL 2    traZODone (DESYREL) 100 MG tablet Take 1 tablet by mouth Every Night.      vitamin D (ERGOCALCIFEROL) 1.25 MG (95773 UT) capsule capsule Take 1 capsule by mouth 1 (One) Time Per Week.       No current facility-administered medications for this visit.           MENTAL STATUS EXAM:   Hygiene:   good  Cooperation:  Cooperative  Eye Contact:  Downcast  Psychomotor Behavior:  Slow  Affect:  Restricted  Hopelessness: 3  Speech:  Normal  Thought Process:  Goal directed  Thought Content:  Normal  Suicidal:  None  Homicidal:  None  Hallucinations:  None  Delusion:   None  Memory:  Intact  Orientation:  Person, Place, Time, and Situation  Reliability:  good  Insight:  Fair  Judgement:  Fair  Impulse Control:  Fair  Physical/Medical Issues:  Yes chronic back pain and diabetes type 2    PROBLEM LIST:   Anxiety, depression, back pain    STRENGTHS:    patient appears motivated for treatment is currently engaged and compliant    WEAKNESSES:  Physical problems, anxiety, helplessness      SHORT-TERM GOALS: Patient will be compliant with clinic appointments.  Patient will be engaged in therapy, medication compliant with minimal side effects. Patient  will report decrease of symptoms and frequency.    LONG-TERM GOALS: Patient will have minimal symptoms of  with continued medication management. Patient will be compliant with treatment and appointments.     DIAGNOSIS:   Encounter Diagnoses   Name Primary?    Dysthymia Yes    Generalized anxiety disorder     Insomnia, unspecified type      Dysthymia and generalized anxiety disorder, insomnia, unspecified type    PLAN:   Patient will continue in monthly individual outpatient treatment and pharmacotherapy as scheduled.  Patient was not in agreement to returning for therapy due to her pain level and not feeling that she can keep appointments.  Patient reports that she came only at the request of her daughter.  Patient was left with her calling in the future if she would like to return and come in for therapy and will leave her case open for a few months in case she does so.       The patient was instructed to call clinic as needed or go to ER if in crisis.       CHRISS ALLEN LCSW, [facility]

## 2024-06-07 ENCOUNTER — INFUSION (OUTPATIENT)
Dept: ONCOLOGY | Facility: HOSPITAL | Age: 74
End: 2024-06-07
Payer: MEDICARE

## 2024-06-07 VITALS
TEMPERATURE: 97.3 F | RESPIRATION RATE: 18 BRPM | BODY MASS INDEX: 25.61 KG/M2 | SYSTOLIC BLOOD PRESSURE: 159 MMHG | DIASTOLIC BLOOD PRESSURE: 99 MMHG | WEIGHT: 163.5 LBS | HEART RATE: 115 BPM | OXYGEN SATURATION: 94 %

## 2024-06-07 DIAGNOSIS — M85.80 OSTEOPENIA WITH HIGH RISK OF FRACTURE: Primary | ICD-10-CM

## 2024-06-07 LAB
ALBUMIN SERPL-MCNC: 4.8 G/DL (ref 3.5–5.2)
ALBUMIN/GLOB SERPL: 1.4 G/DL
ALP SERPL-CCNC: 73 U/L (ref 39–117)
ALT SERPL W P-5'-P-CCNC: 14 U/L (ref 1–33)
ANION GAP SERPL CALCULATED.3IONS-SCNC: 12.7 MMOL/L (ref 5–15)
AST SERPL-CCNC: 16 U/L (ref 1–32)
BILIRUB SERPL-MCNC: 0.3 MG/DL (ref 0–1.2)
BUN SERPL-MCNC: 21 MG/DL (ref 8–23)
BUN/CREAT SERPL: 19.1 (ref 7–25)
CALCIUM SPEC-SCNC: 10.5 MG/DL (ref 8.6–10.5)
CHLORIDE SERPL-SCNC: 99 MMOL/L (ref 98–107)
CO2 SERPL-SCNC: 25.3 MMOL/L (ref 22–29)
CREAT SERPL-MCNC: 1.1 MG/DL (ref 0.57–1)
EGFRCR SERPLBLD CKD-EPI 2021: 52.8 ML/MIN/1.73
GLOBULIN UR ELPH-MCNC: 3.5 GM/DL
GLUCOSE SERPL-MCNC: 211 MG/DL (ref 65–99)
MAGNESIUM SERPL-MCNC: 1.7 MG/DL (ref 1.6–2.4)
PHOSPHATE SERPL-MCNC: 5.1 MG/DL (ref 2.5–4.5)
POTASSIUM SERPL-SCNC: 4.7 MMOL/L (ref 3.5–5.2)
PROT SERPL-MCNC: 8.3 G/DL (ref 6–8.5)
SODIUM SERPL-SCNC: 137 MMOL/L (ref 136–145)

## 2024-06-07 PROCEDURE — 80053 COMPREHEN METABOLIC PANEL: CPT

## 2024-06-07 PROCEDURE — 82306 VITAMIN D 25 HYDROXY: CPT

## 2024-06-07 PROCEDURE — 83735 ASSAY OF MAGNESIUM: CPT

## 2024-06-07 PROCEDURE — 96372 THER/PROPH/DIAG INJ SC/IM: CPT

## 2024-06-07 PROCEDURE — 84100 ASSAY OF PHOSPHORUS: CPT

## 2024-06-07 PROCEDURE — 25010000002 DENOSUMAB 60 MG/ML SOLUTION PREFILLED SYRINGE: Performed by: NURSE PRACTITIONER

## 2024-06-07 RX ADMIN — DENOSUMAB 60 MG: 60 INJECTION SUBCUTANEOUS at 14:00

## 2024-06-08 LAB — 25(OH)D3 SERPL-MCNC: 28.2 NG/ML (ref 30–100)

## 2024-06-12 RX ORDER — ERGOCALCIFEROL 1.25 MG/1
50000 CAPSULE ORAL WEEKLY
Qty: 5 CAPSULE | Refills: 2 | Status: SHIPPED | OUTPATIENT
Start: 2024-06-12

## 2024-08-12 ENCOUNTER — OFFICE VISIT (OUTPATIENT)
Dept: ENDOCRINOLOGY | Facility: CLINIC | Age: 74
End: 2024-08-12
Payer: MEDICARE

## 2024-08-12 VITALS
WEIGHT: 163.2 LBS | BODY MASS INDEX: 25.62 KG/M2 | DIASTOLIC BLOOD PRESSURE: 79 MMHG | SYSTOLIC BLOOD PRESSURE: 141 MMHG | HEIGHT: 67 IN | OXYGEN SATURATION: 99 % | HEART RATE: 89 BPM

## 2024-08-12 DIAGNOSIS — M85.80 OSTEOPENIA WITH HIGH RISK OF FRACTURE: Primary | ICD-10-CM

## 2024-08-12 LAB
ALBUMIN SERPL-MCNC: 4.1 G/DL (ref 3.5–5.2)
ALBUMIN/GLOB SERPL: 1.4 G/DL
ALP SERPL-CCNC: 68 U/L (ref 39–117)
ALT SERPL W P-5'-P-CCNC: 11 U/L (ref 1–33)
ANION GAP SERPL CALCULATED.3IONS-SCNC: 12.5 MMOL/L (ref 5–15)
AST SERPL-CCNC: 14 U/L (ref 1–32)
BILIRUB SERPL-MCNC: 0.3 MG/DL (ref 0–1.2)
BUN SERPL-MCNC: 13 MG/DL (ref 8–23)
BUN/CREAT SERPL: 13.5 (ref 7–25)
CALCIUM SPEC-SCNC: 9.3 MG/DL (ref 8.6–10.5)
CHLORIDE SERPL-SCNC: 104 MMOL/L (ref 98–107)
CO2 SERPL-SCNC: 22.5 MMOL/L (ref 22–29)
CREAT SERPL-MCNC: 0.96 MG/DL (ref 0.57–1)
EGFRCR SERPLBLD CKD-EPI 2021: 62.2 ML/MIN/1.73
GLOBULIN UR ELPH-MCNC: 3 GM/DL
GLUCOSE SERPL-MCNC: 170 MG/DL (ref 65–99)
MAGNESIUM SERPL-MCNC: 2 MG/DL (ref 1.6–2.4)
PHOSPHATE SERPL-MCNC: 3.6 MG/DL (ref 2.5–4.5)
POTASSIUM SERPL-SCNC: 3.9 MMOL/L (ref 3.5–5.2)
PROT SERPL-MCNC: 7.1 G/DL (ref 6–8.5)
SODIUM SERPL-SCNC: 139 MMOL/L (ref 136–145)

## 2024-08-12 PROCEDURE — 1159F MED LIST DOCD IN RCRD: CPT | Performed by: NURSE PRACTITIONER

## 2024-08-12 PROCEDURE — 82306 VITAMIN D 25 HYDROXY: CPT | Performed by: NURSE PRACTITIONER

## 2024-08-12 PROCEDURE — 3078F DIAST BP <80 MM HG: CPT | Performed by: NURSE PRACTITIONER

## 2024-08-12 PROCEDURE — 83735 ASSAY OF MAGNESIUM: CPT | Performed by: NURSE PRACTITIONER

## 2024-08-12 PROCEDURE — 84100 ASSAY OF PHOSPHORUS: CPT | Performed by: NURSE PRACTITIONER

## 2024-08-12 PROCEDURE — 99213 OFFICE O/P EST LOW 20 MIN: CPT | Performed by: NURSE PRACTITIONER

## 2024-08-12 PROCEDURE — 3077F SYST BP >= 140 MM HG: CPT | Performed by: NURSE PRACTITIONER

## 2024-08-12 PROCEDURE — 80053 COMPREHEN METABOLIC PANEL: CPT | Performed by: NURSE PRACTITIONER

## 2024-08-12 PROCEDURE — 1160F RVW MEDS BY RX/DR IN RCRD: CPT | Performed by: NURSE PRACTITIONER

## 2024-08-12 RX ORDER — SEMAGLUTIDE 1.34 MG/ML
INJECTION, SOLUTION SUBCUTANEOUS WEEKLY
COMMUNITY

## 2024-08-12 RX ORDER — DAPAGLIFLOZIN 5 MG/1
5 TABLET, FILM COATED ORAL DAILY
COMMUNITY

## 2024-08-12 NOTE — PROGRESS NOTES
Chief Complaint   Patient presents with    Osteopenia with high risk of fracture        Referring Provider  No ref. provider found     HPI   Charito Freeman is a 73 y.o. female had concerns including Osteopenia with high risk of fracture.    Osteoporosis.    Patient reports that she had her last Prolia injection in June 2024.  She reports that she is doing well with this.  She denies any issues related to her injection.  She reports that she has not fallen since her last visit and denies any recent fractures.    She is continuing to take her vitamin D.    She is currently taking Prolia every 6 months.  She tolerated it well. She has her first injection in 12/2023.    Osteopenia:  She has had 6 or 7 compression fractures in her back without trauma.  She has been on Fosamax for a couple of weeks.    She had Kyoplasty on her back on May 15, 2023.  She fell early in the morning and was not found until the following day.  She was found laying in the floor and are unsure how she ended up in the floor.  DXA Scan 07/2023:  FINDINGS:   Bone densitometry calculations of the lumbar spine and left femoral neck  were obtained.   Left forearm bone mineral density results as follows:   UD BMD is 0.384 g/sq cm.   T-score is -1.0. Z-score is 0.7   MID BMD is 0.513 g/sq cm.   T-score is -1.7. Z-score is 0.7   1/3 BMD is 0.593 g/sq cm.   T-score is -1.7. Z-score is 0.7   Average BMD is 0.493 g/sq cm.    T-score is -1.6 Z-score is 0.7   The results correspond to osteopenia.   Left femoral neck BMD is 0.681 g/sq cm.    T-score is -1.5.   Z score is 0.5.   Left femoral total hip BMD is 0.774 g/sq cm.   T-score is -1.4.   Z-score is 0.3   FRAX    WHO Fracture Risk Assessment Tool   10 year major osteoporotic fracture risk: Not reported due to history of  prior hip or vertebral fracture.   Impression:  Low bone mineral density corresponds to osteopenia within the areas evaluated.    She has been noting increased dizziness since starting on  the Fosamax and has been more unsteady on her feet since as well.    Past Medical History:   Diagnosis Date    Anxiety     Depression     Disease of thyroid gland     DM (diabetes mellitus screen)     GERD (gastroesophageal reflux disease)     Hypertension      Past Surgical History:   Procedure Laterality Date    BRONCHOSCOPY N/A 10/12/2022    Procedure: BRONCHOSCOPY WITH ENDOBRONCHIAL ULTRASOUND;  Surgeon: Ziggy Butler MD;  Location: [facility];  Service: Pulmonary;  Laterality: N/A;    COLONOSCOPY      ENDOSCOPY      HYSTERECTOMY      REPLACEMENT TOTAL KNEE        Family History   Family history unknown: Yes      Social History     Socioeconomic History    Marital status:    Tobacco Use    Smoking status: Never    Smokeless tobacco: Never   Vaping Use    Vaping status: Never Used   Substance and Sexual Activity    Alcohol use: No    Drug use: No    Sexual activity: Defer      Allergies   Allergen Reactions    Citalopram     Dasatinib     Imatinib     Lisinopril       Current Outpatient Medications on File Prior to Visit   Medication Sig Dispense Refill    amLODIPine (NORVASC) 10 MG tablet Take 1 tablet by mouth Daily.      dapagliflozin (Farxiga) 5 MG tablet tablet Take 1 tablet by mouth Daily.      Insulin Glargine (Lantus SoloStar) 100 UNIT/ML injection pen Inject 20 Units under the skin into the appropriate area as directed 2 (Two) Times a Day.      levothyroxine (SYNTHROID, LEVOTHROID) 25 MCG tablet Daily.      metoprolol succinate XL (TOPROL-XL) 25 MG 24 hr tablet Take 1 tablet by mouth Daily.      pantoprazole (PROTONIX) 40 MG EC tablet Take 1 tablet by mouth Daily.      Semaglutide,0.25 or 0.5MG/DOS, (Ozempic, 0.25 or 0.5 MG/DOSE,) 2 MG/1.5ML solution pen-injector Inject  under the skin into the appropriate area as directed 1 (One) Time Per Week.      vitamin D (ERGOCALCIFEROL) 1.25 MG (19585 UT) capsule capsule Take 1 capsule by mouth 1 (One) Time Per Week. 5 capsule 2    albuterol sulfate HFA  "108 (90 Base) MCG/ACT inhaler Inhale 2 puffs Every 4 (Four) Hours As Needed for Wheezing. (Patient not taking: Reported on 2/12/2024) 18 g 11    BOSULIF 100 MG chemo tablet Take 2 tablets by mouth Daily.      Fluticasone Furoate (Arnuity Ellipta) 100 MCG/ACT aerosol powder  Inhale 1 puff Daily. (Patient not taking: Reported on 2/12/2024) 30 each 5    HYDROcodone-acetaminophen (NORCO)  MG per tablet Take 1 tablet by mouth Every 12 (Twelve) Hours As Needed for Moderate Pain. (Patient not taking: Reported on 8/12/2024)      ipratropium (ATROVENT) 0.02 % nebulizer solution Take 2.5 mL by nebulization 4 (Four) Times a Day As Needed for Wheezing or Shortness of Air. (Patient not taking: Reported on 2/12/2024) 12.5 mL 11    Liraglutide (Victoza) 18 MG/3ML solution pen-injector injection Inject 1.2 mg under the skin into the appropriate area as directed Daily. (Patient not taking: Reported on 8/12/2024)      Teriparatide, Recombinant, (Forteo) 600 MCG/2.4ML injection Inject 0.08 mL under the skin into the appropriate area as directed Daily. (Patient not taking: Reported on 2/12/2024) 2.4 mL 2    traZODone (DESYREL) 100 MG tablet Take 1 tablet by mouth Every Night. (Patient not taking: Reported on 8/12/2024)       No current facility-administered medications on file prior to visit.        Review of Systems   Constitutional:  Positive for fatigue.   Eyes:  Positive for blurred vision.   Musculoskeletal:  Positive for arthralgias, back pain and myalgias.   Neurological:  Positive for dizziness and weakness.   Psychiatric/Behavioral:  Positive for sleep disturbance.    All other systems reviewed and are negative.    /79 (BP Location: Right arm, Patient Position: Sitting, Cuff Size: Adult)   Pulse 89   Ht 170.2 cm (67\")   Wt 74 kg (163 lb 3.2 oz)   SpO2 99%   BMI 25.56 kg/m²      Physical Exam  Vitals reviewed.   Constitutional:       Appearance: Normal appearance.      Comments: Unsteady gait.   Eyes:      " "Extraocular Movements: Extraocular movements intact.   Cardiovascular:      Rate and Rhythm: Normal rate.   Pulmonary:      Effort: Pulmonary effort is normal.   Neurological:      General: No focal deficit present.      Mental Status: She is alert and oriented to person, place, and time.   Psychiatric:         Mood and Affect: Mood normal.         Behavior: Behavior normal.         Thought Content: Thought content normal.         Judgment: Judgment normal.       CMP:  Lab Results   Component Value Date    BUN 21 06/07/2024    CREATININE 1.10 (H) 06/07/2024    EGFRIFNONA 70 03/26/2020    BCR 19.1 06/07/2024     06/07/2024    K 4.7 06/07/2024    CO2 25.3 06/07/2024    CALCIUM 10.5 06/07/2024    ALBUMIN 4.8 06/07/2024    LABIL2 1.6 06/27/2015    BILITOT 0.3 06/07/2024    ALKPHOS 73 06/07/2024    AST 16 06/07/2024    ALT 14 06/07/2024     Lipid Panel:  No results found for: \"CHOL\", \"TRIG\", \"HDL\", \"VLDL\", \"LDL\"    HbA1c:     Glucose:  Lab Results   Component Value Date    POCGLU 107 10/12/2022     Microalbumin:  No results found for: \"MALBCRERATIO\"  TSH:  Lab Results   Component Value Date    TSH 2.910 05/17/2023       Assessment and Plan    Diagnoses and all orders for this visit:    1. Osteopenia with high risk of fracture (Primary)  Assessment & Plan:  Continue with Prolia every 6 months.  Will obtain labs at intervals with injection protocol.  Will obtain DEXA scan 2 years after starting injection.  Follow-up in 6 months.    Orders:  -     Vitamin D,25-Hydroxy  -     Phosphorus  -     Magnesium  -     Comprehensive Metabolic Panel  -     Comprehensive Metabolic Panel  -     Magnesium  -     Phosphorus  -     Vitamin D 25 Hydroxy             Return in about 6 months (around 2/12/2025) for Follow-up appointment. The patient was instructed to contact the clinic with any interval questions or concerns.        This document has been electronically signed by MERCEDES Tejeda  August 12, 2024 14:27 EDT "   Endocrinology    Please note that portions of this document were completed with a voice recognition program. Efforts were made to edit the dictations, but occasionally words are mis-transcribed.

## 2024-08-12 NOTE — ASSESSMENT & PLAN NOTE
Continue with Prolia every 6 months.  Will obtain labs at intervals with injection protocol.  Will obtain DEXA scan 2 years after starting injection.  Follow-up in 6 months.

## 2024-08-13 LAB — 25(OH)D3 SERPL-MCNC: 32.4 NG/ML (ref 30–100)

## 2024-10-28 ENCOUNTER — OFFICE VISIT (OUTPATIENT)
Dept: ENDOCRINOLOGY | Facility: CLINIC | Age: 74
End: 2024-10-28
Payer: MEDICARE

## 2024-10-28 VITALS
HEIGHT: 67 IN | HEART RATE: 98 BPM | SYSTOLIC BLOOD PRESSURE: 169 MMHG | DIASTOLIC BLOOD PRESSURE: 87 MMHG | WEIGHT: 167.4 LBS | BODY MASS INDEX: 26.27 KG/M2 | OXYGEN SATURATION: 100 %

## 2024-10-28 DIAGNOSIS — Z79.4 TYPE 2 DIABETES MELLITUS WITH HYPERGLYCEMIA, WITH LONG-TERM CURRENT USE OF INSULIN: Primary | ICD-10-CM

## 2024-10-28 DIAGNOSIS — E11.65 TYPE 2 DIABETES MELLITUS WITH HYPERGLYCEMIA, WITH LONG-TERM CURRENT USE OF INSULIN: Primary | ICD-10-CM

## 2024-10-28 LAB
EXPIRATION DATE: ABNORMAL
GLUCOSE BLDC GLUCOMTR-MCNC: 313 MG/DL (ref 70–130)
HBA1C MFR BLD: 9.8 % (ref 4.5–5.7)
Lab: ABNORMAL

## 2024-10-28 RX ORDER — GLIPIZIDE 5 MG/1
5 TABLET, FILM COATED, EXTENDED RELEASE ORAL DAILY
Qty: 90 TABLET | Refills: 1 | Status: SHIPPED | OUTPATIENT
Start: 2024-10-28 | End: 2025-10-28

## 2024-10-28 RX ORDER — DAPAGLIFLOZIN 10 MG/1
10 TABLET, FILM COATED ORAL DAILY
Qty: 90 TABLET | Refills: 1 | Status: SHIPPED | OUTPATIENT
Start: 2024-10-28

## 2024-10-28 NOTE — PROGRESS NOTES
Chief Complaint   Patient presents with    Diabetes     New paient for DM        Referring Provider  No ref. provider found     HPI   Charito Freeman is a 74 y.o. female had concerns including Diabetes (New paient for DM).    T2DM.    I have seen patient in the past for osteoporosis and she would like to now be managed for her diabetes. She reports that her PCP informed her that there isn't much more that they can do for her.  She is asking about additional therapy.  She reports that she has had some increased stress as well.    Diabetes:  Diabetes was diagnosed .  Complications include neuropathy.  [facility]  Current medications for diabetes include Lantus 30 units BID, Farxiga 5 mg QD.  Previous meds: Ozempic-nausea/belching/abd pain, Victoza-switched, Metformin-stopped, Glipizide-stopped unsure why  Glucagon: Last use none  She checks her blood sugar 2-4 times per day.  FSB+, PPD: 300-400  Hypos: rarely    ACE/ARB:no, Statin: no  Labs:  Lipid Panel:utd  THEODORE: due    The following portions of the patient's history were reviewed and updated as appropriate: allergies, current medications, past family history, past medical history, past social history, past surgical history, and problem list.    Diet: doesn't limit her carbs and sugars.  Breakfast:  Lunch:  Dinner:  Drinks:  Snacks:    Past Medical History:   Diagnosis Date    Anxiety     Depression     Disease of thyroid gland     DM (diabetes mellitus screen)     GERD (gastroesophageal reflux disease)     Hypertension      Past Surgical History:   Procedure Laterality Date    BRONCHOSCOPY N/A 10/12/2022    Procedure: BRONCHOSCOPY WITH ENDOBRONCHIAL ULTRASOUND;  Surgeon: Ziggy Butler MD;  Location: [facility];  Service: Pulmonary;  Laterality: N/A;    COLONOSCOPY      ENDOSCOPY      HYSTERECTOMY      REPLACEMENT TOTAL KNEE        Family History   Family history unknown: Yes      Social History      Socioeconomic History    Marital status:    Tobacco Use    Smoking status: Never    Smokeless tobacco: Never   Vaping Use    Vaping status: Never Used   Substance and Sexual Activity    Alcohol use: No    Drug use: No    Sexual activity: Defer      Allergies   Allergen Reactions    Citalopram Unknown - Low Severity    Dasatinib Unknown - Low Severity    Imatinib Unknown - Low Severity    Lisinopril Unknown - Low Severity      Current Outpatient Medications on File Prior to Visit   Medication Sig Dispense Refill    Insulin Glargine (Lantus SoloStar) 100 UNIT/ML injection pen Inject 30 Units under the skin into the appropriate area as directed 2 (Two) Times a Day.      albuterol sulfate  (90 Base) MCG/ACT inhaler Inhale 2 puffs Every 4 (Four) Hours As Needed for Wheezing. (Patient not taking: Reported on 2/12/2024) 18 g 11    amLODIPine (NORVASC) 10 MG tablet Take 1 tablet by mouth Daily.      BOSULIF 100 MG chemo tablet Take 2 tablets by mouth Daily.      Fluticasone Furoate (Arnuity Ellipta) 100 MCG/ACT aerosol powder  Inhale 1 puff Daily. (Patient not taking: Reported on 2/12/2024) 30 each 5    HYDROcodone-acetaminophen (NORCO)  MG per tablet Take 1 tablet by mouth Every 12 (Twelve) Hours As Needed for Moderate Pain. (Patient not taking: Reported on 8/12/2024)      ipratropium (ATROVENT) 0.02 % nebulizer solution Take 2.5 mL by nebulization 4 (Four) Times a Day As Needed for Wheezing or Shortness of Air. (Patient not taking: Reported on 2/12/2024) 12.5 mL 11    levothyroxine (SYNTHROID, LEVOTHROID) 25 MCG tablet Daily.      Liraglutide (Victoza) 18 MG/3ML solution pen-injector injection Inject 1.2 mg under the skin into the appropriate area as directed Daily. (Patient not taking: Reported on 8/12/2024)      metoprolol succinate XL (TOPROL-XL) 25 MG 24 hr tablet Take 1 tablet by mouth Daily.      pantoprazole (PROTONIX) 40 MG EC tablet Take 1 tablet by mouth Daily.      Teriparatide,  "Recombinant, (Forteo) 600 MCG/2.4ML injection Inject 0.08 mL under the skin into the appropriate area as directed Daily. (Patient not taking: Reported on 2/12/2024) 2.4 mL 2    traZODone (DESYREL) 100 MG tablet Take 1 tablet by mouth Every Night. (Patient not taking: Reported on 8/12/2024)      vitamin D (ERGOCALCIFEROL) 1.25 MG (97962 UT) capsule capsule Take 1 capsule by mouth 1 (One) Time Per Week. 5 capsule 2     No current facility-administered medications on file prior to visit.        Review of Systems   Constitutional:  Positive for fatigue. Negative for unexpected weight gain and unexpected weight loss.   Eyes:  Positive for blurred vision.   Endocrine: Positive for polydipsia, polyphagia and polyuria.   Musculoskeletal:  Positive for arthralgias, back pain and myalgias.   Psychiatric/Behavioral:  Positive for sleep disturbance.    All other systems reviewed and are negative.     /87 (BP Location: Right arm, Patient Position: Sitting, Cuff Size: Adult)   Pulse 98   Ht 170.2 cm (67\")   Wt 75.9 kg (167 lb 6.4 oz)   SpO2 100%   BMI 26.22 kg/m²      Physical Exam  Constitutional:       Appearance: Normal appearance.   Eyes:      Extraocular Movements: Extraocular movements intact.   Neck:      Thyroid: No thyroid mass, thyromegaly or thyroid tenderness.   Cardiovascular:      Rate and Rhythm: Normal rate.   Pulmonary:      Effort: Pulmonary effort is normal.   Feet:      Right foot:      Skin integrity: Skin integrity normal.      Left foot:      Skin integrity: Skin integrity normal.   Skin:     Findings: No abrasion or wound.   Neurological:      General: No focal deficit present.      Mental Status: She is alert and oriented to person, place, and time.   Psychiatric:         Mood and Affect: Mood normal.         Behavior: Behavior normal.         Thought Content: Thought content normal.         Judgment: Judgment normal.       CMP:  Lab Results   Component Value Date    BUN 13 08/12/2024    " "CREATININE 0.96 08/12/2024    EGFRIFNONA 70 03/26/2020    BCR 13.5 08/12/2024     08/12/2024    K 3.9 08/12/2024    CO2 22.5 08/12/2024    CALCIUM 9.3 08/12/2024    ALBUMIN 4.1 08/12/2024    LABIL2 1.6 06/27/2015    BILITOT 0.3 08/12/2024    ALKPHOS 68 08/12/2024    AST 14 08/12/2024    ALT 11 08/12/2024     Lipid Panel:  No results found for: \"CHOL\", \"TRIG\", \"HDL\", \"VLDL\", \"LDL\"  HbA1c:  Lab Results   Component Value Date    HGBA1C 9.8 (A) 10/28/2024     Glucose:  Lab Results   Component Value Date    POCGLU 313 (A) 10/28/2024     Microalbumin:  No results found for: \"MALBCRERATIO\"  TSH:  Lab Results   Component Value Date    TSH 2.910 05/17/2023       Assessment and Plan    Diagnoses and all orders for this visit:    1. Type 2 diabetes mellitus with hyperglycemia, with long-term current use of insulin (Primary)  Assessment & Plan:  -Diabetes is above goal with A1c 9.8.  -Discussed dietary and exercise guidelines with patient.  -Discussed the importance of yearly eye exams.  -Discussed the importance of checking BG's regularly.  Discussed using CGM.  She would like to start using this.  Will send in RX for this to DME.  -Continue Lantus 30 units twice a day.  -Continue Farxiga 10 mg daily.  -Start Glipizide 5 mg daily.  -Discussed Glucagon use and appropriate timing for this.   -S/S hypoglycemia reviewed with Rule of 15's advised.  -Follow-up in 1 month.    Orders:  -     POC Glycosylated Hemoglobin (Hb A1C)  -     POC Glucose Fingerstick    Other orders  -     dapagliflozin Propanediol (Farxiga) 10 MG tablet; Take 10 mg by mouth Daily.  Dispense: 90 tablet; Refill: 1  -     glipizide (GLUCOTROL XL) 5 MG ER tablet; Take 1 tablet by mouth Daily.  Dispense: 90 tablet; Refill: 1         Return in about 4 weeks (around 11/25/2024) for Follow-up appointment. The patient was instructed to contact the clinic with any interval questions or concerns.        This document has been electronically signed by Zahraa" MERCEDES Stoll  October 30, 2024 13:57 EDT   Endocrinology    Please note that portions of this document were completed with a voice recognition program. Efforts were made to edit the dictations, but occasionally words are mis-transcribed.

## 2024-10-28 NOTE — PROGRESS NOTES
Chief Complaint   Patient presents with   • Diabetes     New paient for DM        Referring Provider  No ref. provider found     HPI   Charito Freeman is a 74 y.o. female had concerns including Diabetes (New paient for DM).    Osteoporosis.    Patient reports that she had her last Prolia injection in June 2024.  She reports that she is doing well with this.  She denies any issues related to her injection.  She reports that she has not fallen since her last visit and denies any recent fractures.    She is continuing to take her vitamin D.    She is currently taking Prolia every 6 months.  She tolerated it well. She has her first injection in 12/2023.    Osteopenia:  She has had 6 or 7 compression fractures in her back without trauma.  She has been on Fosamax for a couple of weeks.    She had Kyoplasty on her back on May 15, 2023.  She fell early in the morning and was not found until the following day.  She was found laying in the floor and are unsure how she ended up in the floor.  DXA Scan 07/2023:  FINDINGS:   Bone densitometry calculations of the lumbar spine and left femoral neck  were obtained.   Left forearm bone mineral density results as follows:   UD BMD is 0.384 g/sq cm.   T-score is -1.0. Z-score is 0.7   MID BMD is 0.513 g/sq cm.   T-score is -1.7. Z-score is 0.7   1/3 BMD is 0.593 g/sq cm.   T-score is -1.7. Z-score is 0.7   Average BMD is 0.493 g/sq cm.    T-score is -1.6 Z-score is 0.7   The results correspond to osteopenia.   Left femoral neck BMD is 0.681 g/sq cm.    T-score is -1.5.   Z score is 0.5.   Left femoral total hip BMD is 0.774 g/sq cm.   T-score is -1.4.   Z-score is 0.3   FRAX    WHO Fracture Risk Assessment Tool   10 year major osteoporotic fracture risk: Not reported due to history of  prior hip or vertebral fracture.   Impression:  Low bone mineral density corresponds to osteopenia within the areas evaluated.    She has been noting increased dizziness since starting on the Fosamax and  has been more unsteady on her feet since as well.    Past Medical History:   Diagnosis Date   • Anxiety    • Depression    • Disease of thyroid gland    • DM (diabetes mellitus screen)    • GERD (gastroesophageal reflux disease)    • Hypertension      Past Surgical History:   Procedure Laterality Date   • BRONCHOSCOPY N/A 10/12/2022    Procedure: BRONCHOSCOPY WITH ENDOBRONCHIAL ULTRASOUND;  Surgeon: Ziggy Butler MD;  Location: [facility];  Service: Pulmonary;  Laterality: N/A;   • COLONOSCOPY     • ENDOSCOPY     • HYSTERECTOMY     • REPLACEMENT TOTAL KNEE        Family History   Family history unknown: Yes      Social History     Socioeconomic History   • Marital status:    Tobacco Use   • Smoking status: Never   • Smokeless tobacco: Never   Vaping Use   • Vaping status: Never Used   Substance and Sexual Activity   • Alcohol use: No   • Drug use: No   • Sexual activity: Defer      Allergies   Allergen Reactions   • Citalopram    • Dasatinib    • Imatinib    • Lisinopril       Current Outpatient Medications on File Prior to Visit   Medication Sig Dispense Refill   • albuterol sulfate  (90 Base) MCG/ACT inhaler Inhale 2 puffs Every 4 (Four) Hours As Needed for Wheezing. (Patient not taking: Reported on 2/12/2024) 18 g 11   • amLODIPine (NORVASC) 10 MG tablet Take 1 tablet by mouth Daily.     • BOSULIF 100 MG chemo tablet Take 2 tablets by mouth Daily.     • dapagliflozin (Farxiga) 5 MG tablet tablet Take 1 tablet by mouth Daily.     • Fluticasone Furoate (Arnuity Ellipta) 100 MCG/ACT aerosol powder  Inhale 1 puff Daily. (Patient not taking: Reported on 2/12/2024) 30 each 5   • HYDROcodone-acetaminophen (NORCO)  MG per tablet Take 1 tablet by mouth Every 12 (Twelve) Hours As Needed for Moderate Pain. (Patient not taking: Reported on 8/12/2024)     • Insulin Glargine (Lantus SoloStar) 100 UNIT/ML injection pen Inject 20 Units under the skin into the appropriate area as directed 2 (Two) Times a  "Day.     • ipratropium (ATROVENT) 0.02 % nebulizer solution Take 2.5 mL by nebulization 4 (Four) Times a Day As Needed for Wheezing or Shortness of Air. (Patient not taking: Reported on 2/12/2024) 12.5 mL 11   • levothyroxine (SYNTHROID, LEVOTHROID) 25 MCG tablet Daily.     • Liraglutide (Victoza) 18 MG/3ML solution pen-injector injection Inject 1.2 mg under the skin into the appropriate area as directed Daily. (Patient not taking: Reported on 8/12/2024)     • metoprolol succinate XL (TOPROL-XL) 25 MG 24 hr tablet Take 1 tablet by mouth Daily.     • pantoprazole (PROTONIX) 40 MG EC tablet Take 1 tablet by mouth Daily.     • Semaglutide,0.25 or 0.5MG/DOS, (Ozempic, 0.25 or 0.5 MG/DOSE,) 2 MG/1.5ML solution pen-injector Inject  under the skin into the appropriate area as directed 1 (One) Time Per Week.     • Teriparatide, Recombinant, (Forteo) 600 MCG/2.4ML injection Inject 0.08 mL under the skin into the appropriate area as directed Daily. (Patient not taking: Reported on 2/12/2024) 2.4 mL 2   • traZODone (DESYREL) 100 MG tablet Take 1 tablet by mouth Every Night. (Patient not taking: Reported on 8/12/2024)     • vitamin D (ERGOCALCIFEROL) 1.25 MG (90727 UT) capsule capsule Take 1 capsule by mouth 1 (One) Time Per Week. 5 capsule 2     No current facility-administered medications on file prior to visit.        Review of Systems   Constitutional:  Positive for fatigue.   Eyes:  Positive for blurred vision.   Musculoskeletal:  Positive for arthralgias, back pain and myalgias.   Neurological:  Positive for dizziness and weakness.   Psychiatric/Behavioral:  Positive for sleep disturbance.    All other systems reviewed and are negative.    /87 (BP Location: Right arm, Patient Position: Sitting, Cuff Size: Adult)   Pulse 98   Ht 170.2 cm (67\")   Wt 75.9 kg (167 lb 6.4 oz)   SpO2 100%   BMI 26.22 kg/m²      Physical Exam  Vitals reviewed.   Constitutional:       Appearance: Normal appearance.      Comments: " "Unsteady gait.   Eyes:      Extraocular Movements: Extraocular movements intact.   Cardiovascular:      Rate and Rhythm: Normal rate.   Pulmonary:      Effort: Pulmonary effort is normal.   Neurological:      General: No focal deficit present.      Mental Status: She is alert and oriented to person, place, and time.   Psychiatric:         Mood and Affect: Mood normal.         Behavior: Behavior normal.         Thought Content: Thought content normal.         Judgment: Judgment normal.     CMP:  Lab Results   Component Value Date    BUN 13 08/12/2024    CREATININE 0.96 08/12/2024    EGFRIFNONA 70 03/26/2020    BCR 13.5 08/12/2024     08/12/2024    K 3.9 08/12/2024    CO2 22.5 08/12/2024    CALCIUM 9.3 08/12/2024    ALBUMIN 4.1 08/12/2024    LABIL2 1.6 06/27/2015    BILITOT 0.3 08/12/2024    ALKPHOS 68 08/12/2024    AST 14 08/12/2024    ALT 11 08/12/2024     Lipid Panel:  No results found for: \"CHOL\", \"TRIG\", \"HDL\", \"VLDL\", \"LDL\"    HbA1c:     Glucose:  Lab Results   Component Value Date    POCGLU 107 10/12/2022     Microalbumin:  No results found for: \"MALBCRERATIO\"  TSH:  Lab Results   Component Value Date    TSH 2.910 05/17/2023       Assessment and Plan    There are no diagnoses linked to this encounter.           No follow-ups on file. The patient was instructed to contact the clinic with any interval questions or concerns.        This document has been electronically signed by MERCEDES Tejeda  October 28, 2024 14:57 EDT   Endocrinology    Please note that portions of this document were completed with a voice recognition program. Efforts were made to edit the dictations, but occasionally words are mis-transcribed.   "

## 2024-10-28 NOTE — PATIENT INSTRUCTIONS
Lantus 30 units twice a day  Farxiga 10 mg daily  Glipizide 5 mg daily    Consider wearing a continuous sensor to monitor blood sugars.

## 2024-10-30 NOTE — ASSESSMENT & PLAN NOTE
-Diabetes is above goal with A1c 9.8.  -Discussed dietary and exercise guidelines with patient.  -Discussed the importance of yearly eye exams.  -Discussed the importance of checking BG's regularly.  Discussed using CGM.  She would like to start using this.  Will send in RX for this to DME.  -Continue Lantus 30 units twice a day.  -Continue Farxiga 10 mg daily.  -Start Glipizide 5 mg daily.  -Discussed Glucagon use and appropriate timing for this.   -S/S hypoglycemia reviewed with Rule of 15's advised.  -Follow-up in 1 month.

## 2024-11-25 ENCOUNTER — OFFICE VISIT (OUTPATIENT)
Dept: ENDOCRINOLOGY | Facility: CLINIC | Age: 74
End: 2024-11-25
Payer: MEDICARE

## 2024-11-25 VITALS
BODY MASS INDEX: 27.25 KG/M2 | WEIGHT: 174 LBS | OXYGEN SATURATION: 98 % | HEART RATE: 79 BPM | DIASTOLIC BLOOD PRESSURE: 64 MMHG | SYSTOLIC BLOOD PRESSURE: 131 MMHG

## 2024-11-25 DIAGNOSIS — E11.65 TYPE 2 DIABETES MELLITUS WITH HYPERGLYCEMIA, WITH LONG-TERM CURRENT USE OF INSULIN: Primary | ICD-10-CM

## 2024-11-25 DIAGNOSIS — Z79.4 TYPE 2 DIABETES MELLITUS WITH HYPERGLYCEMIA, WITH LONG-TERM CURRENT USE OF INSULIN: Primary | ICD-10-CM

## 2024-11-25 LAB
EXPIRATION DATE: ABNORMAL
GLUCOSE BLDC GLUCOMTR-MCNC: 402 MG/DL (ref 70–130)
Lab: ABNORMAL

## 2024-11-25 PROCEDURE — 83735 ASSAY OF MAGNESIUM: CPT | Performed by: NURSE PRACTITIONER

## 2024-11-25 PROCEDURE — 82306 VITAMIN D 25 HYDROXY: CPT | Performed by: NURSE PRACTITIONER

## 2024-11-25 PROCEDURE — 84100 ASSAY OF PHOSPHORUS: CPT | Performed by: NURSE PRACTITIONER

## 2024-11-25 PROCEDURE — 82043 UR ALBUMIN QUANTITATIVE: CPT

## 2024-11-25 PROCEDURE — 80061 LIPID PANEL: CPT | Performed by: NURSE PRACTITIONER

## 2024-11-25 PROCEDURE — 80053 COMPREHEN METABOLIC PANEL: CPT | Performed by: NURSE PRACTITIONER

## 2024-11-25 PROCEDURE — 82570 ASSAY OF URINE CREATININE: CPT

## 2024-11-25 PROCEDURE — 84443 ASSAY THYROID STIM HORMONE: CPT | Performed by: NURSE PRACTITIONER

## 2024-11-25 RX ORDER — DAPAGLIFLOZIN 10 MG/1
10 TABLET, FILM COATED ORAL DAILY
Qty: 90 TABLET | Refills: 0 | Status: SHIPPED | OUTPATIENT
Start: 2024-11-25

## 2024-11-25 RX ORDER — INSULIN GLARGINE 100 [IU]/ML
45 INJECTION, SOLUTION SUBCUTANEOUS 2 TIMES DAILY
Qty: 30 ML | Refills: 2 | Status: SHIPPED | OUTPATIENT
Start: 2024-11-25

## 2024-11-25 RX ORDER — GLIPIZIDE 10 MG/1
10 TABLET ORAL
Qty: 60 TABLET | Refills: 1 | Status: SHIPPED | OUTPATIENT
Start: 2024-11-25

## 2024-11-25 NOTE — PROGRESS NOTES
Chief Complaint   Patient presents with    Diabetes     F/u w/ dm2, last A1c 10/28/24 value 9.8       HPI   Charito Freeman is a 74 y.o. female who presents today for follow-up of type 2 diabetes.  Diabetes was diagnosed in the 1990s. Last A1c was 9.8 on 10/28/2024. She has been on insulin for a long time. She has Lindsey CGM but has not started using it yet. She checks her glucose in the morning and reports fasting blood sugars of 250s. She reports her blood sugars get up to 300s with meals. She is currently taking Lantus 45 units BID, which was increased from 30 units BID by her PCP last week, glipizide 5mg once daily, and Farxiga 10mg once daily for her diabetes. She denies any hypoglycemia. She denies any UTIs or yeast infections since starting Farxiga.     Past medications: Ozempic- nausea/belching/abdominal pain, Victoza- GI intolerance   Diabetic complications: peripheral neuropathy  Last optho exam: UTD  Last microalbumin: today  Last foot exam: today  Statin: no   Lipid panel: UTD  ACE/ARB: no     Nutrition:  Eats 1 meal a day: beans, potatoes and meat  Snacks: chips, snack cakes  Drinks: diet coke, water    The following portions of the patient's history were reviewed and updated as appropriate: allergies, current medications, past family history, past medical history, past social history, past surgical history, and problem list.    /64 (BP Location: Right arm, Patient Position: Sitting, Cuff Size: Adult)   Pulse 79   Wt 78.9 kg (174 lb)   SpO2 98%   BMI 27.25 kg/m²    Past Medical History:   Diagnosis Date    Anxiety     Depression     Disease of thyroid gland     DM (diabetes mellitus screen)     GERD (gastroesophageal reflux disease)     Hypertension      Past Surgical History:   Procedure Laterality Date    BRONCHOSCOPY N/A 10/12/2022    Procedure: BRONCHOSCOPY WITH ENDOBRONCHIAL ULTRASOUND;  Surgeon: Ziggy Butler MD;  Location: [facility];  Service: Pulmonary;  Laterality: N/A;    COLONOSCOPY       ENDOSCOPY      HYSTERECTOMY      REPLACEMENT TOTAL KNEE        Family History   Family history unknown: Yes      Social History     Socioeconomic History    Marital status:    Tobacco Use    Smoking status: Never    Smokeless tobacco: Never   Vaping Use    Vaping status: Never Used   Substance and Sexual Activity    Alcohol use: No    Drug use: No    Sexual activity: Defer      Allergies   Allergen Reactions    Citalopram Unknown - Low Severity    Dasatinib Unknown - Low Severity    Imatinib Unknown - Low Severity    Lisinopril Unknown - Low Severity      Current Outpatient Medications on File Prior to Visit   Medication Sig Dispense Refill    BOSULIF 100 MG chemo tablet Take 2 tablets by mouth Daily.      HYDROcodone-acetaminophen (NORCO)  MG per tablet Take 1 tablet by mouth Every 12 (Twelve) Hours As Needed for Moderate Pain.      levothyroxine (SYNTHROID, LEVOTHROID) 25 MCG tablet Daily.      metoprolol succinate XL (TOPROL-XL) 25 MG 24 hr tablet Take 1 tablet by mouth Daily.      pantoprazole (PROTONIX) 40 MG EC tablet Take 1 tablet by mouth Daily.      vitamin D (ERGOCALCIFEROL) 1.25 MG (47186 UT) capsule capsule Take 1 capsule by mouth 1 (One) Time Per Week. 5 capsule 2    [DISCONTINUED] glipizide (GLUCOTROL XL) 5 MG ER tablet Take 1 tablet by mouth Daily. 90 tablet 1    [DISCONTINUED] Insulin Glargine (Lantus SoloStar) 100 UNIT/ML injection pen Inject 30 Units under the skin into the appropriate area as directed 2 (Two) Times a Day.      amLODIPine (NORVASC) 10 MG tablet Take 1 tablet by mouth Daily.      Fluticasone Furoate (Arnuity Ellipta) 100 MCG/ACT aerosol powder  Inhale 1 puff Daily. (Patient not taking: Reported on 11/25/2024) 30 each 5    ipratropium (ATROVENT) 0.02 % nebulizer solution Take 2.5 mL by nebulization 4 (Four) Times a Day As Needed for Wheezing or Shortness of Air. (Patient not taking: Reported on 11/25/2024) 12.5 mL 11    Liraglutide (Victoza) 18 MG/3ML solution  pen-injector injection Inject 1.2 mg under the skin into the appropriate area as directed Daily. (Patient not taking: Reported on 11/25/2024)      Teriparatide, Recombinant, (Forteo) 600 MCG/2.4ML injection Inject 0.08 mL under the skin into the appropriate area as directed Daily. (Patient not taking: Reported on 11/25/2024) 2.4 mL 2    traZODone (DESYREL) 100 MG tablet Take 1 tablet by mouth Every Night. (Patient not taking: Reported on 11/25/2024)      [DISCONTINUED] albuterol sulfate  (90 Base) MCG/ACT inhaler Inhale 2 puffs Every 4 (Four) Hours As Needed for Wheezing. (Patient not taking: Reported on 11/25/2024) 18 g 11    [DISCONTINUED] dapagliflozin Propanediol (Farxiga) 10 MG tablet Take 10 mg by mouth Daily. (Patient not taking: Reported on 11/25/2024) 90 tablet 1     No current facility-administered medications on file prior to visit.      Review of Systems   Constitutional:  Positive for fatigue.   Eyes:  Positive for blurred vision.   Gastrointestinal:  Negative for abdominal pain, constipation and diarrhea.   Endocrine: Positive for polydipsia, polyphagia and polyuria.   Psychiatric/Behavioral:  Positive for sleep disturbance.      Physical Exam  Vitals reviewed.   Constitutional:       Appearance: Normal appearance.   Eyes:      Extraocular Movements: Extraocular movements intact.   Cardiovascular:      Rate and Rhythm: Normal rate.      Pulses:           Dorsalis pedis pulses are 2+ on the right side and 2+ on the left side.        Posterior tibial pulses are 2+ on the right side and 2+ on the left side.   Pulmonary:      Effort: Pulmonary effort is normal.   Feet:      Right foot:      Protective Sensation: 6 sites tested.  6 sites sensed.      Skin integrity: Callus and dry skin present.      Left foot:      Protective Sensation: 6 sites tested.  6 sites sensed.      Skin integrity: Callus and dry skin present.      Comments: Diabetic Foot Exam Performed and Monofilament Test Performed    "  Skin:     General: Skin is warm.   Neurological:      General: No focal deficit present.      Mental Status: She is alert and oriented to person, place, and time.   Psychiatric:         Mood and Affect: Mood normal.         Behavior: Behavior normal.         Thought Content: Thought content normal.         Judgment: Judgment normal.       CMP:  Lab Results   Component Value Date    BUN 13 08/12/2024    CREATININE 0.96 08/12/2024    EGFR 62.2 08/12/2024    BCR 13.5 08/12/2024     08/12/2024    K 3.9 08/12/2024    CO2 22.5 08/12/2024    CALCIUM 9.3 08/12/2024    ALBUMIN 4.1 08/12/2024    AGRATIO 1.4 08/12/2024    BILITOT 0.3 08/12/2024    ALKPHOS 68 08/12/2024    AST 14 08/12/2024    ALT 11 08/12/2024     Lipid Panel:  No results found for: \"CHOL\", \"TRIG\", \"HDL\", \"VLDL\", \"LDL\"  HbA1c:  Lab Results   Component Value Date    HGBA1C 9.8 (A) 10/28/2024     Microalbumin:  No results found for: \"MALBCRERATIO\"  TSH:  Lab Results   Component Value Date    TSH 2.910 05/17/2023       Assessment and Plan  Diagnoses and all orders for this visit:    1. Type 2 diabetes mellitus with hyperglycemia, with long-term current use of insulin (Primary)  Assessment & Plan:  -Diabetes is not controlled with A1c 9.8 on 10/28/24.   -She received her Lindsey CGM but has not started using it yet.  -Unable to prescribe GLP1s due to cost. Mounjaro and Trulicity are $200- $250 per month and this is not affordable. Did not tolerate Ozempic or Victoza previously.  -She declines adding meal time insulin at this time.   -Her PCP increased her Lantus to 45 units BID last week. Continue this dose.  -Increase glipizide to 10 mg BID  -Will add metformin 500 mg twice a day (1 pill with breakfast and 1 pill with dinner), increase to 1,000 mg twice a day (two pills with breakfast and two pills with dinner) as tolerated   -Continue Farxiga 10 mg daily. Discussed the medication's MOA and protective cardiovascular and renal benefits for diabetes. " Medication may cause  more frequent urination particularly upon starting the medication. Take one tablet in the morning with or without food. Encouraged to drink plenty of water as to not get dehydrated, particularly in warmer weather or with activity. Also discussed there may be an increased incidence of UTIs or yeast infections and to monitor for signs/symptoms.    -Discussed dietary and exercise guidelines with patient. 150 g carbs per day and 150 minutes of exercise per week. Increase protein with complex carbs. Avoid foods high in refined sugars and sugary drinks.   -Discussed the importance of yearly eye exams.  -Discussed Glucagon use and appropriate timing for this.   -S/S hypoglycemia reviewed with Rule of 15's advised.  -Discussed long term risks of untreated/undertreated diabetes including retinopathy, neuropathy, nephropathy, amputations, hospitalizations, MI, CVA.    -2 month follow up with CGM data for further evaluation and medication adjustment    Orders:  -     POC Glucose, Blood  -     Microalbumin / Creatinine Urine Ratio - Urine, Clean Catch  -     TSH  -     Lipid Panel  -     glipizide (GLUCOTROL) 10 MG tablet; Take 1 tablet by mouth 2 (Two) Times a Day Before Meals.  Dispense: 60 tablet; Refill: 1  -     metFORMIN (GLUCOPHAGE) 500 MG tablet; Take 2 tablets by mouth 2 (Two) Times a Day With Meals.  Dispense: 120 tablet; Refill: 1  -     Insulin Glargine (BASAGLAR KWIKPEN) 100 UNIT/ML injection pen; Inject 45 Units under the skin into the appropriate area as directed 2 (Two) Times a Day.  Dispense: 30 mL; Refill: 2  -     dapagliflozin Propanediol (Farxiga) 10 MG tablet; Take 10 mg by mouth Daily.  Dispense: 90 tablet; Refill: 0      Return in about 2 months (around 1/28/2025). The patient was instructed to contact the clinic with any interval questions or concerns.    Please note that portions of this document were completed with a voice recognition program. Efforts were made to edit the  dictations, but occasionally words are mis-transcribed.  This document has been electronically signed by MERCEDES Killian  November 25, 2024 21:09 EST

## 2024-11-25 NOTE — PATIENT INSTRUCTIONS
11/25/24    Charito Freeman 1950     Your A1C is:   Lab Results   Component Value Date    HGBA1C 9.8 (A) 10/28/2024       ADA General Goals: A1c: < 7%                                                  Fasting/before meal glucose: <150 mg/dL                                    2 Hour after meal glucoses: < 180 mg/dL                                        Bedtime glucose:120-180              -Lantus 45 units in the morning and at night  -Glipizide 10mg twice daily (1 pill with breakfast and 1 pill with dinner)   -Metformin 500 mg twice a day (1 pill with breakfast and 1 pill with dinner), increase to 1,000 mg twice a day (two pills with breakfast and two pills with dinner) as tolerated.   -Farxiga 10 mg daily       If after 2 weeks, before meal blood sugars are not lower than 150, call the office.    If you are having frequent blood sugars lower than 70, call the office.    MERCEDES Killian    urinary symptoms

## 2024-11-26 LAB
25(OH)D3 SERPL-MCNC: 33.5 NG/ML (ref 30–100)
ALBUMIN SERPL-MCNC: 4.2 G/DL (ref 3.5–5.2)
ALBUMIN UR-MCNC: <1.2 MG/DL
ALBUMIN/GLOB SERPL: 1.4 G/DL
ALP SERPL-CCNC: 87 U/L (ref 39–117)
ALT SERPL W P-5'-P-CCNC: 17 U/L (ref 1–33)
ANION GAP SERPL CALCULATED.3IONS-SCNC: 13.1 MMOL/L (ref 5–15)
AST SERPL-CCNC: 15 U/L (ref 1–32)
BILIRUB SERPL-MCNC: 0.3 MG/DL (ref 0–1.2)
BUN SERPL-MCNC: 27 MG/DL (ref 8–23)
BUN/CREAT SERPL: 20.3 (ref 7–25)
CALCIUM SPEC-SCNC: 9.1 MG/DL (ref 8.6–10.5)
CHLORIDE SERPL-SCNC: 100 MMOL/L (ref 98–107)
CHOLEST SERPL-MCNC: 127 MG/DL (ref 0–200)
CO2 SERPL-SCNC: 21.9 MMOL/L (ref 22–29)
CREAT SERPL-MCNC: 1.33 MG/DL (ref 0.57–1)
CREAT UR-MCNC: 29.8 MG/DL
EGFRCR SERPLBLD CKD-EPI 2021: 41.8 ML/MIN/1.73
GLOBULIN UR ELPH-MCNC: 3.1 GM/DL
GLUCOSE SERPL-MCNC: 312 MG/DL (ref 65–99)
HDLC SERPL-MCNC: 28 MG/DL (ref 40–60)
LDLC SERPL CALC-MCNC: 47 MG/DL (ref 0–100)
LDLC/HDLC SERPL: 1.09 {RATIO}
MAGNESIUM SERPL-MCNC: 1.9 MG/DL (ref 1.6–2.4)
MICROALBUMIN/CREAT UR: NORMAL MG/G{CREAT}
PHOSPHATE SERPL-MCNC: 3.9 MG/DL (ref 2.5–4.5)
POTASSIUM SERPL-SCNC: 5.1 MMOL/L (ref 3.5–5.2)
PROT SERPL-MCNC: 7.3 G/DL (ref 6–8.5)
SODIUM SERPL-SCNC: 135 MMOL/L (ref 136–145)
TRIGL SERPL-MCNC: 343 MG/DL (ref 0–150)
TSH SERPL DL<=0.05 MIU/L-ACNC: 3.38 UIU/ML (ref 0.27–4.2)
VLDLC SERPL-MCNC: 52 MG/DL (ref 5–40)

## 2024-11-26 NOTE — ASSESSMENT & PLAN NOTE
-Diabetes is not controlled with A1c 9.8 on 10/28/24.   -She received her Lindsey CGM but has not started using it yet.  -Unable to prescribe GLP1s due to cost. Mounjaro and Trulicity are $200- $250 per month and this is not affordable. Did not tolerate Ozempic or Victoza previously.  -She declines adding meal time insulin at this time.   -Her PCP increased her Lantus to 45 units BID last week. Continue this dose.  -Increase glipizide to 10 mg BID  -Will add metformin 500 mg twice a day (1 pill with breakfast and 1 pill with dinner), increase to 1,000 mg twice a day (two pills with breakfast and two pills with dinner) as tolerated   -Continue Farxiga 10 mg daily. Discussed the medication's MOA and protective cardiovascular and renal benefits for diabetes. Medication may cause  more frequent urination particularly upon starting the medication. Take one tablet in the morning with or without food. Encouraged to drink plenty of water as to not get dehydrated, particularly in warmer weather or with activity. Also discussed there may be an increased incidence of UTIs or yeast infections and to monitor for signs/symptoms.    -Discussed dietary and exercise guidelines with patient. 150 g carbs per day and 150 minutes of exercise per week. Increase protein with complex carbs. Avoid foods high in refined sugars and sugary drinks.   -Discussed the importance of yearly eye exams.  -Discussed Glucagon use and appropriate timing for this.   -S/S hypoglycemia reviewed with Rule of 15's advised.  -Discussed long term risks of untreated/undertreated diabetes including retinopathy, neuropathy, nephropathy, amputations, hospitalizations, MI, CVA.    -2 month follow up with CGM data for further evaluation and medication adjustment

## 2024-11-27 RX ORDER — ROSUVASTATIN CALCIUM 5 MG/1
5 TABLET, COATED ORAL DAILY
Qty: 90 TABLET | Refills: 2 | Status: SHIPPED | OUTPATIENT
Start: 2024-11-27

## 2024-12-06 ENCOUNTER — INFUSION (OUTPATIENT)
Dept: ONCOLOGY | Facility: HOSPITAL | Age: 74
End: 2024-12-06
Payer: MEDICARE

## 2024-12-06 ENCOUNTER — APPOINTMENT (OUTPATIENT)
Dept: ONCOLOGY | Facility: HOSPITAL | Age: 74
End: 2024-12-06
Payer: MEDICARE

## 2024-12-06 VITALS
TEMPERATURE: 96.9 F | OXYGEN SATURATION: 96 % | BODY MASS INDEX: 27.08 KG/M2 | DIASTOLIC BLOOD PRESSURE: 64 MMHG | WEIGHT: 172.9 LBS | RESPIRATION RATE: 18 BRPM | HEART RATE: 120 BPM | SYSTOLIC BLOOD PRESSURE: 155 MMHG

## 2024-12-06 DIAGNOSIS — M85.80 OSTEOPENIA WITH HIGH RISK OF FRACTURE: Primary | ICD-10-CM

## 2024-12-06 PROCEDURE — 96372 THER/PROPH/DIAG INJ SC/IM: CPT

## 2024-12-06 PROCEDURE — 25010000002 DENOSUMAB 60 MG/ML SOLUTION PREFILLED SYRINGE: Performed by: NURSE PRACTITIONER

## 2024-12-06 RX ADMIN — DENOSUMAB 60 MG: 60 INJECTION SUBCUTANEOUS at 15:46

## 2025-03-14 DIAGNOSIS — Z79.4 TYPE 2 DIABETES MELLITUS WITH HYPERGLYCEMIA, WITH LONG-TERM CURRENT USE OF INSULIN: ICD-10-CM

## 2025-03-14 DIAGNOSIS — E11.65 TYPE 2 DIABETES MELLITUS WITH HYPERGLYCEMIA, WITH LONG-TERM CURRENT USE OF INSULIN: ICD-10-CM

## 2025-06-04 ENCOUNTER — OFFICE VISIT (OUTPATIENT)
Dept: PULMONOLOGY | Facility: CLINIC | Age: 75
End: 2025-06-04
Payer: MEDICARE

## 2025-06-04 VITALS
HEART RATE: 89 BPM | TEMPERATURE: 97.4 F | WEIGHT: 183 LBS | BODY MASS INDEX: 28.72 KG/M2 | SYSTOLIC BLOOD PRESSURE: 124 MMHG | DIASTOLIC BLOOD PRESSURE: 76 MMHG | HEIGHT: 67 IN | OXYGEN SATURATION: 96 %

## 2025-06-04 DIAGNOSIS — R91.8 MULTIPLE PULMONARY NODULES: Primary | ICD-10-CM

## 2025-06-04 DIAGNOSIS — J45.50 SEVERE PERSISTENT ASTHMA WITHOUT COMPLICATION: ICD-10-CM

## 2025-06-04 RX ORDER — ONDANSETRON 4 MG/1
TABLET, ORALLY DISINTEGRATING ORAL
COMMUNITY
Start: 2025-04-07

## 2025-06-04 RX ORDER — NAPROXEN 500 MG/1
500 TABLET ORAL
COMMUNITY
Start: 2025-05-19

## 2025-06-04 NOTE — PROGRESS NOTES
"Chief Complaint  Shortness of Breath    Subjective          Charito Freeman presents to [facility] for   History of Present Illness      Ms. Freeman is a 74-year-old female with a medical history significant for anxiety, depression, GERD, diabetes and hypertension.    She presents today for follow-up on shortness of breath.  She reports that she has had increased shortness of breath and is coughing up a lot of mucus.  She is not currently taking any inhalers and reports that she stopped using her oxygen as she did not feel that she needed it.  She is a non-smoker.          Objective   Vital Signs:   /76   Pulse 89   Temp 97.4 °F (36.3 °C)   Ht 170.2 cm (67.01\")   Wt 83 kg (183 lb)   SpO2 96%   BMI 28.66 kg/m²         Physical Exam    GENERAL APPEARANCE: Well developed, well nourished, alert and cooperative, and appears to be in no acute distress.    HEAD: normocephalic. Atraumatic.    EYES: PERRL, EOMI. Vision is grossly intact.    THROAT: Oral cavity and pharynx normal. No inflammation, swelling, exudate, or lesions.     NECK: Neck supple.  No thyromegaly.    CARDIAC: Normal S1 and S2. No S3, S4 or murmurs. Rhythm is regular.     RESPIRATORY:Bilateral air entry positive.  No wheezing, crackles or rhonchi noted.    GI: Positive bowel sounds. Soft, nondistended, nontender.     MUSCULOSKELETAL: No significant deformity or joint abnormality. No edema. Peripheral pulses intact. No varicosities.    NEUROLOGICAL: Strength and sensation symmetric and intact throughout.     PSYCHIATRIC: The mental examination revealed the patient was oriented to person, place, and time.       Estimated body mass index is 28.66 kg/m² as calculated from the following:    Height as of this encounter: 170.2 cm (67.01\").    Weight as of this encounter: 83 kg (183 lb).        Result Review :   The following data was reviewed by: MERCEDES Delatorre on 06/04/2025:  Common labs  "         11/25/2024    15:10 11/25/2024    16:01 2/14/2025    10:56 6/6/2025    13:21   Common Labs   Glucose  312   215    BUN  27   21.7    Creatinine  1.33   1.13    Sodium  135   138    Potassium  5.1   4.8    Chloride  100   103    Calcium  9.1   9.3    Albumin  4.2   4.7    Total Bilirubin  0.3   0.3    Alkaline Phosphatase  87   95    AST (SGOT)  15   17    ALT (SGPT)  17   21    WBC   13.6        Hemoglobin   13.1        Hematocrit   40.5        Platelets   283        Total Cholesterol  127      Triglycerides  343      HDL Cholesterol  28      LDL Cholesterol   47      Hemoglobin A1C   9.6        Microalbumin, Urine <1.2          Details          This result is from an external source.                  PFT:NA    Low dose lung cancer screening:NA    Previous chest imaging:    CT Chest 2/16/24    FINDINGS:    Lungs and pleural spaces:  Multiple bilateral pulmonary parenchymal  nodules appear stable from the previous exam. Index nodule right middle  lobe is stable or slightly decreased from previous.  Index nodule left  lower lobe subpleural location is 6.8 mm and is stable.  No  consolidation.  No pneumothorax.  No significant effusion.  No definite  new pulmonary nodules identified.    Heart:  Cardiomegaly is stable.  Mild coronary artery calcifications  are noted.  No significant pericardial effusion.    Mediastinum:  Unremarkable as visualized.  No hilar or mediastinal  adenopathy.    Bones/joints:  Interval kyphoplasty changes for T11 and stable  compression fracture of T12. New compression fractures noted of L1 and  L2.  No dislocation.    Soft tissues:  Unremarkable as visualized.    Vasculature:  Stable calcified atherosclerosis thoracic aorta without  evidence of aneurysm.    Lymph nodes:  No axillary adenopathy identified.    Gallbladder and bile ducts:  Cholecystectomy.    Spleen:  Splenic calcifications are stable.    Other findings:  Calcified granulomas are stable.     IMPRESSION:  1.  Multiple  "bilateral pulmonary nodules are stable from previous with  index nodules as above. Background granulomatous changes are also noted.  2.  No new pulmonary nodules identified.  3.  Stable cardiomegaly and mild coronary artery calcifications.  4.  Interval development of L1 and L2 compression fractures. Interval  kyphoplasty changes for T11 compression fracture.  5.  No acute thoracic findings. Other incidental and nonacute findings  as described above.        This report was finalized on 2/16/2024 1:31 PM by Dr. Paul Hawkins MD.    Alpha-1 antitrypsin screening:NA    STOP-Bang Score:   NA  [place] Sleepiness Scale:   NA      ABG:    pH No results found for: \"PHART\"   pO2 No results found for: \"PO2ART\"   pCO2 No results found for: \"VXA8UAW\"   HCO3 No results found for: \"XDK3MKA\"                      Assessment and Plan    Problem List Items Addressed This Visit          Pulmonary and Pneumonias    Multiple pulmonary nodules - Primary    Overview   Added automatically from request for surgery [id number]         Relevant Medications    ipratropium-albuterol (DUO-NEB) 0.5-2.5 mg/3 ml nebulizer    Fluticasone-Umeclidin-Vilant (Trelegy Ellipta) 100-62.5-25 MCG/ACT inhaler    Other Relevant Orders    CT Chest Without Contrast     Other Visit Diagnoses         Severe persistent asthma without complication        Relevant Medications    ipratropium-albuterol (DUO-NEB) 0.5-2.5 mg/3 ml nebulizer    Fluticasone-Umeclidin-Vilant (Trelegy Ellipta) 100-62.5-25 MCG/ACT inhaler            Charito Freeman  reports that she has never smoked. She has never used smokeless tobacco.     Will plan to follow up on pulmonary nodule with CT Chest.    Spirometry was completed in office.  Severe Obstruction is noted.  I suspect that she has asthma.  Will start her on Trelegy once daily.  Provided her with a sample.  Will also start her on duonebs as needed.    Provided her with a nebulizer machine.    She  is not currently using her oxygen. She " tells met hat she was told in the hospital that she did not need it.         Follow Up   Return in about 4 weeks (around 7/2/2025).  Patient was given instructions and counseling regarding her condition or for health maintenance advice. Please see specific information pulled into the AVS if appropriate.

## 2025-06-06 ENCOUNTER — INFUSION (OUTPATIENT)
Dept: ONCOLOGY | Facility: HOSPITAL | Age: 75
End: 2025-06-06
Payer: MEDICARE

## 2025-06-06 ENCOUNTER — LAB (OUTPATIENT)
Dept: ONCOLOGY | Facility: HOSPITAL | Age: 75
End: 2025-06-06
Payer: MEDICARE

## 2025-06-06 VITALS
DIASTOLIC BLOOD PRESSURE: 72 MMHG | TEMPERATURE: 97.6 F | RESPIRATION RATE: 20 BRPM | OXYGEN SATURATION: 96 % | HEART RATE: 91 BPM | BODY MASS INDEX: 29 KG/M2 | WEIGHT: 185.2 LBS | SYSTOLIC BLOOD PRESSURE: 153 MMHG

## 2025-06-06 DIAGNOSIS — M85.80 OSTEOPENIA WITH HIGH RISK OF FRACTURE: Primary | ICD-10-CM

## 2025-06-06 DIAGNOSIS — M85.80 OSTEOPENIA WITH HIGH RISK OF FRACTURE: ICD-10-CM

## 2025-06-06 LAB
ALBUMIN SERPL-MCNC: 4.7 G/DL (ref 3.5–5.2)
ALBUMIN/GLOB SERPL: 1.6 G/DL
ALP SERPL-CCNC: 95 U/L (ref 39–117)
ALT SERPL W P-5'-P-CCNC: 21 U/L (ref 1–33)
ANION GAP SERPL CALCULATED.3IONS-SCNC: 12.2 MMOL/L (ref 5–15)
AST SERPL-CCNC: 17 U/L (ref 1–32)
BILIRUB SERPL-MCNC: 0.3 MG/DL (ref 0–1.2)
BUN SERPL-MCNC: 21.7 MG/DL (ref 8–23)
BUN/CREAT SERPL: 19.2 (ref 7–25)
CALCIUM SPEC-SCNC: 9.3 MG/DL (ref 8.6–10.5)
CHLORIDE SERPL-SCNC: 103 MMOL/L (ref 98–107)
CO2 SERPL-SCNC: 22.8 MMOL/L (ref 22–29)
CREAT SERPL-MCNC: 1.13 MG/DL (ref 0.57–1)
EGFRCR SERPLBLD CKD-EPI 2021: 50.8 ML/MIN/1.73
FEV1/FVC: 66 %
FEV1: 0.73 LITERS
FVC VOL RESPIRATORY: 1.11 LITERS
GLOBULIN UR ELPH-MCNC: 2.9 GM/DL
GLUCOSE SERPL-MCNC: 215 MG/DL (ref 65–99)
POTASSIUM SERPL-SCNC: 4.8 MMOL/L (ref 3.5–5.2)
PROT SERPL-MCNC: 7.6 G/DL (ref 6–8.5)
SODIUM SERPL-SCNC: 138 MMOL/L (ref 136–145)

## 2025-06-06 PROCEDURE — 80053 COMPREHEN METABOLIC PANEL: CPT

## 2025-06-06 PROCEDURE — 25010000002 DENOSUMAB 60 MG/ML SOLUTION PREFILLED SYRINGE: Performed by: NURSE PRACTITIONER

## 2025-06-06 PROCEDURE — 96372 THER/PROPH/DIAG INJ SC/IM: CPT

## 2025-06-06 RX ORDER — IPRATROPIUM BROMIDE AND ALBUTEROL SULFATE 2.5; .5 MG/3ML; MG/3ML
3 SOLUTION RESPIRATORY (INHALATION) 4 TIMES DAILY PRN
Qty: 360 ML | Refills: 3 | Status: SHIPPED | OUTPATIENT
Start: 2025-06-06

## 2025-06-06 RX ORDER — FLUTICASONE FUROATE, UMECLIDINIUM BROMIDE AND VILANTEROL TRIFENATATE 100; 62.5; 25 UG/1; UG/1; UG/1
1 POWDER RESPIRATORY (INHALATION)
Qty: 60 EACH | Refills: 5 | Status: SHIPPED | OUTPATIENT
Start: 2025-06-06

## 2025-06-06 RX ADMIN — DENOSUMAB 60 MG: 60 INJECTION SUBCUTANEOUS at 13:53

## 2025-06-06 ASSESSMENT — PULMONARY FUNCTION TESTS
FEV1/FVC: 66
FVC: 1.11
FEV1: 0.73

## 2025-06-19 ENCOUNTER — HOSPITAL ENCOUNTER (OUTPATIENT)
Facility: HOSPITAL | Age: 75
Discharge: HOME | End: 2025-06-19
Admitting: NURSE PRACTITIONER
Payer: MEDICARE

## 2025-06-19 DIAGNOSIS — R91.8 MULTIPLE PULMONARY NODULES: ICD-10-CM

## 2025-06-19 PROCEDURE — 71250 CT THORAX DX C-: CPT

## 2025-07-01 ENCOUNTER — OFFICE VISIT (OUTPATIENT)
Dept: PULMONOLOGY | Facility: CLINIC | Age: 75
End: 2025-07-01
Payer: MEDICARE

## 2025-07-01 VITALS
OXYGEN SATURATION: 92 % | SYSTOLIC BLOOD PRESSURE: 128 MMHG | DIASTOLIC BLOOD PRESSURE: 76 MMHG | TEMPERATURE: 97.2 F | HEIGHT: 67 IN | WEIGHT: 189 LBS | BODY MASS INDEX: 29.66 KG/M2 | HEART RATE: 88 BPM

## 2025-07-01 DIAGNOSIS — R91.8 MULTIPLE PULMONARY NODULES: ICD-10-CM

## 2025-07-01 DIAGNOSIS — J45.50 SEVERE PERSISTENT ASTHMA WITHOUT COMPLICATION: Primary | ICD-10-CM

## 2025-07-01 DIAGNOSIS — Z79.4 TYPE 2 DIABETES MELLITUS WITH HYPERGLYCEMIA, WITH LONG-TERM CURRENT USE OF INSULIN: ICD-10-CM

## 2025-07-01 DIAGNOSIS — E11.65 TYPE 2 DIABETES MELLITUS WITH HYPERGLYCEMIA, WITH LONG-TERM CURRENT USE OF INSULIN: ICD-10-CM

## 2025-07-01 RX ORDER — IPRATROPIUM BROMIDE AND ALBUTEROL SULFATE 2.5; .5 MG/3ML; MG/3ML
3 SOLUTION RESPIRATORY (INHALATION) 4 TIMES DAILY PRN
Qty: 360 ML | Refills: 3 | Status: SHIPPED | OUTPATIENT
Start: 2025-07-01

## 2025-07-01 RX ORDER — BUDESONIDE 0.5 MG/2ML
0.5 INHALANT ORAL
Qty: 120 ML | Refills: 5 | Status: SHIPPED | OUTPATIENT
Start: 2025-07-01

## 2025-07-01 RX ORDER — ARFORMOTEROL TARTRATE 15 UG/2ML
15 SOLUTION RESPIRATORY (INHALATION)
Qty: 120 ML | Refills: 5 | Status: SHIPPED | OUTPATIENT
Start: 2025-07-01

## 2025-07-01 NOTE — PROGRESS NOTES
"Chief Complaint  Multiple pulmonary nodules    Subjective          Charito Freeman presents to [facility]ULMON[facility] for   History of Present Illness    Ms. Puente is a 74-year-old female with a medical history significant for [place], depression, diabetes, GERD, hypothyroidism and hyperlipidemia.    She presents today for follow-up on multiple pulmonary nodules.  She continues to complain of a cough and mild shortness of breath.  She reports that she did get the Trelegy inhaler but has not used very much of it as it was very expensive.  She also reports that her insurance would not pay for DuoNebs so she does not have these either.  She recently underwent a CT chest for further evaluation of pulmonary nodules.      Objective   Vital Signs:   /76   Pulse 88   Temp 97.2 °F (36.2 °C)   Ht 170.2 cm (67.01\")   Wt 85.7 kg (189 lb)   SpO2 92%   BMI 29.59 kg/m²         Physical Exam    GENERAL APPEARANCE: Well developed, well nourished, alert and cooperative, and appears to be in no acute distress.    HEAD: normocephalic. Atraumatic.    EYES: PERRL, EOMI. Vision is grossly intact.    THROAT: Oral cavity and pharynx normal. No inflammation, swelling, exudate, or lesions.     NECK: Neck supple.  No thyromegaly.    CARDIAC: Normal S1 and S2. No S3, S4 or murmurs. Rhythm is regular.     RESPIRATORY:Bilateral air entry positive. Bilateral diminished breath sounds. No wheezing, crackles or rhonchi noted.    GI: Positive bowel sounds. Soft, nondistended, nontender.     MUSCULOSKELETAL: No significant deformity or joint abnormality. No edema. Peripheral pulses intact. No varicosities.    NEUROLOGICAL: Strength and sensation symmetric and intact throughout.     PSYCHIATRIC: The mental examination revealed the patient was oriented to person, place, and time.       Estimated body mass index is 29.59 kg/m² as calculated from the following:    Height as of this encounter: 170.2 cm " "(67.01\").    Weight as of this encounter: 85.7 kg (189 lb).        Result Review :   The following data was reviewed by: MERCEDES Delatorre on 07/01/2025:  Common labs          11/25/2024    15:10 11/25/2024    16:01 2/14/2025    10:56 6/6/2025    13:21   Common Labs   Glucose  312   215    BUN  27   21.7    Creatinine  1.33   1.13    Sodium  135   138    Potassium  5.1   4.8    Chloride  100   103    Calcium  9.1   9.3    Albumin  4.2   4.7    Total Bilirubin  0.3   0.3    Alkaline Phosphatase  87   95    AST (SGOT)  15   17    ALT (SGPT)  17   21    WBC   13.6        Hemoglobin   13.1        Hematocrit   40.5        Platelets   283        Total Cholesterol  127      Triglycerides  343      HDL Cholesterol  28      LDL Cholesterol   47      Hemoglobin A1C   9.6        Microalbumin, Urine <1.2          Details          This result is from an external source.                  PFT:NA    Low dose lung cancer screening:NA    Previous chest imaging:    CT Chest 6/19/25    FINDINGS:    Limitations:  Please note that reported measurements are made  manually, as computer generated (AI) measurements can over measure  lesions. Additionally, lesions identified by AI may have been present  presently, significant nodules will be discussed in the report and the  visual depictions of lesions provided by AI are for general reference  only.    Lungs and pleural spaces:  Spacious pulmonary nodule detected.  Left  lower lobe pulmonary nodule measuring 1.2 cm shows calcification and is  benign.  No consolidation.  No significant effusion.  No pneumothorax.    Heart:  Tiny pericardial effusion noted impression.  No cardiomegaly.   No significant coronary artery calcifications.    Bones/joints:  Multiple lower thoracic compression deformities are  noted.    Soft tissues:  Unremarkable as visualized.    Vasculature:  Unremarkable as visualized.  No thoracic aortic  aneurysm.    Lymph nodes:  Unremarkable as visualized.  No " "enlarged lymph nodes.     IMPRESSION:  1.  Multiple lower thoracic compression deformities are noted.  2.  Spacious pulmonary nodule detected.  3.  Left lower lobe pulmonary nodule measuring 1.2 cm shows  calcification and is benign.     This report was finalized on 6/19/2025 12:43 PM by Dr. Bhanu Mcqueen MD.    Alpha-1 antitrypsin screening:NA    STOP-Bang Score:   NA  [place] Sleepiness Scale:   NA      ABG:    pH No results found for: \"PHART\"   pO2 No results found for: \"PO2ART\"   pCO2 No results found for: \"XAH6VGW\"   HCO3 No results found for: \"ULR3NBI\"                      Assessment and Plan    Problem List Items Addressed This Visit          Pulmonary and Pneumonias    Multiple pulmonary nodules    Overview   Added automatically from request for surgery [id number]         Relevant Medications    arformoterol (BROVANA) 15 MCG/2ML nebulizer solution    budesonide (Pulmicort) 0.5 MG/2ML nebulizer solution    ipratropium-albuterol (DUO-NEB) 0.5-2.5 mg/3 ml nebulizer     Other Visit Diagnoses         Severe persistent asthma without complication    -  Primary    Relevant Medications    arformoterol (BROVANA) 15 MCG/2ML nebulizer solution    budesonide (Pulmicort) 0.5 MG/2ML nebulizer solution    ipratropium-albuterol (DUO-NEB) 0.5-2.5 mg/3 ml nebulizer    Other Relevant Orders    Ambulatory Referral to Disease State Management            Charito Freeman  reports that she has never smoked. She has never used smokeless tobacco.      CT chest was reviewed.  Pulmonary nodule is noted to be stable and calcified.  This is likely a benign nodule.  We will continue to follow imaging closely.    Suspect the underlying cause of her chronic cough and shortness of breath is asthma.  Severe obstruction was noted on spirometry completed in office at her last visit.  Her insurance did not pay for Trelegy nor nebulizer treatments.  Will send her DuoNebs, as well as Pulmicort, and pro jason to direct Rx.      She is also noted to have " elevated eosinophils on recent lab work.  I believe she would benefit from a biologic injection for better control of her asthma.         Follow Up   Return in about 3 months (around 10/1/2025).  Patient was given instructions and counseling regarding her condition or for health maintenance advice. Please see specific information pulled into the AVS if appropriate.

## 2025-07-09 ENCOUNTER — OFFICE VISIT (OUTPATIENT)
Dept: ENDOCRINOLOGY | Facility: CLINIC | Age: 75
End: 2025-07-09
Payer: MEDICARE

## 2025-07-09 VITALS
WEIGHT: 183.2 LBS | HEART RATE: 92 BPM | OXYGEN SATURATION: 95 % | BODY MASS INDEX: 28.69 KG/M2 | DIASTOLIC BLOOD PRESSURE: 62 MMHG | SYSTOLIC BLOOD PRESSURE: 132 MMHG

## 2025-07-09 DIAGNOSIS — Z79.4 TYPE 2 DIABETES MELLITUS WITH HYPERGLYCEMIA, WITH LONG-TERM CURRENT USE OF INSULIN: Primary | ICD-10-CM

## 2025-07-09 DIAGNOSIS — E11.65 TYPE 2 DIABETES MELLITUS WITH HYPERGLYCEMIA, WITH LONG-TERM CURRENT USE OF INSULIN: Primary | ICD-10-CM

## 2025-07-09 LAB
EXPIRATION DATE: ABNORMAL
GLUCOSE BLDC GLUCOMTR-MCNC: 388 MG/DL (ref 70–130)
Lab: ABNORMAL

## 2025-07-09 PROCEDURE — 84443 ASSAY THYROID STIM HORMONE: CPT

## 2025-07-09 PROCEDURE — 82043 UR ALBUMIN QUANTITATIVE: CPT

## 2025-07-09 PROCEDURE — 80061 LIPID PANEL: CPT

## 2025-07-09 PROCEDURE — 82570 ASSAY OF URINE CREATININE: CPT

## 2025-07-09 RX ORDER — ATORVASTATIN CALCIUM 40 MG/1
40 TABLET, FILM COATED ORAL DAILY
COMMUNITY
Start: 2025-07-02

## 2025-07-09 RX ORDER — PEN NEEDLE, DIABETIC 33 GX5/32"
1 NEEDLE, DISPOSABLE MISCELLANEOUS 2 TIMES DAILY
Qty: 200 EACH | Refills: 5 | Status: SHIPPED | OUTPATIENT
Start: 2025-07-09

## 2025-07-10 LAB
ALBUMIN UR-MCNC: 4.8 MG/DL
CHOLEST SERPL-MCNC: 137 MG/DL (ref 0–200)
CREAT UR-MCNC: 101.5 MG/DL
HDLC SERPL-MCNC: 25 MG/DL (ref 40–60)
LDLC SERPL CALC-MCNC: 71 MG/DL (ref 0–100)
LDLC/HDLC SERPL: 2.46 {RATIO}
MICROALBUMIN/CREAT UR: 47.3 MG/G (ref 0–29)
TRIGL SERPL-MCNC: 252 MG/DL (ref 0–150)
TSH SERPL DL<=0.05 MIU/L-ACNC: 3.62 UIU/ML (ref 0.27–4.2)
VLDLC SERPL-MCNC: 41 MG/DL (ref 5–40)

## 2025-07-12 NOTE — ASSESSMENT & PLAN NOTE
-Diabetes is not controlled with A1c 7.8 on 5/19/25.   -Will switch from Lantus 45 units twice daily to Tresiba 80 units daily (PAP)  -Restart Farxiga 10 mg daily (PAP)  -Continue metformin 1,000 mg twice daily  -Discussed dietary and exercise guidelines with patient. 150 g carbs per day and 150 minutes of exercise per week. Increase protein with complex carbs. Avoid foods high in refined sugars and sugary drinks.   -Discussed the importance of yearly eye exams.  -Discussed Glucagon use and appropriate timing for this.   -S/S hypoglycemia reviewed with Rule of 15's advised.  -Discussed long term risks of untreated/undertreated diabetes including retinopathy, neuropathy, nephropathy, amputations, hospitalizations, MI, CVA.    -Discussed the medication's MOA and protective cardiovascular and renal benefits for diabetes. Medication may cause  more frequent urination particularly upon starting the medication. Take one tablet in the morning with or without food. Encouraged to drink plenty of water as to not get dehydrated, particularly in warmer weather or with activity. Also discussed there may be an increased incidence of UTIs or yeast infections and to monitor for signs/symptoms.

## 2025-08-12 ENCOUNTER — TELEPHONE (OUTPATIENT)
Dept: PULMONOLOGY | Facility: CLINIC | Age: 75
End: 2025-08-12
Payer: MEDICARE

## 2025-08-18 ENCOUNTER — TELEPHONE (OUTPATIENT)
Dept: PULMONOLOGY | Facility: CLINIC | Age: 75
End: 2025-08-18
Payer: MEDICARE

## (undated) DEVICE — GOWN,REINF,POLY,ECL,PP SLV,XL: Brand: MEDLINE

## (undated) DEVICE — SINGLE USE SUCTION VALVE MAJ-209: Brand: SINGLE USE SUCTION VALVE (STERILE)

## (undated) DEVICE — TRAP,MUCUS SPECIMEN,40CC: Brand: MEDLINE

## (undated) DEVICE — SINGLE USE BIOPSY VALVE MAJ-210: Brand: SINGLE USE BIOPSY VALVE (STERILE)

## (undated) DEVICE — KT ASP VIZISHOT 5SYRG 5BIOPSY/VLV 22G

## (undated) DEVICE — TUBING, SUCTION, 1/4" X 20', STRAIGHT: Brand: MEDLINE INDUSTRIES, INC.

## (undated) DEVICE — Device

## (undated) DEVICE — SUCTION CANISTER, 1500CC, RIGID: Brand: DEROYAL

## (undated) DEVICE — SYR LUER SLPTP 50ML

## (undated) DEVICE — TUBING, SUCTION, 3/16" X 6', STRAIGHT: Brand: MEDLINE

## (undated) DEVICE — ADAPT SWVL FIBROPTIC BRONCH

## (undated) DEVICE — HOLDER: Brand: DEROYAL